# Patient Record
Sex: FEMALE | Race: WHITE | NOT HISPANIC OR LATINO | Employment: FULL TIME | URBAN - METROPOLITAN AREA
[De-identification: names, ages, dates, MRNs, and addresses within clinical notes are randomized per-mention and may not be internally consistent; named-entity substitution may affect disease eponyms.]

---

## 2023-07-07 ENCOUNTER — OFFICE VISIT (OUTPATIENT)
Dept: PRIMARY CARE | Facility: CLINIC | Age: 48
End: 2023-07-07
Payer: COMMERCIAL

## 2023-07-07 VITALS
HEIGHT: 66 IN | WEIGHT: 266 LBS | TEMPERATURE: 97.9 F | DIASTOLIC BLOOD PRESSURE: 84 MMHG | RESPIRATION RATE: 16 BRPM | HEART RATE: 68 BPM | BODY MASS INDEX: 42.75 KG/M2 | SYSTOLIC BLOOD PRESSURE: 132 MMHG | OXYGEN SATURATION: 98 %

## 2023-07-07 DIAGNOSIS — Z12.31 ENCOUNTER FOR SCREENING MAMMOGRAM FOR MALIGNANT NEOPLASM OF BREAST: ICD-10-CM

## 2023-07-07 DIAGNOSIS — E78.2 MIXED HYPERLIPIDEMIA: ICD-10-CM

## 2023-07-07 DIAGNOSIS — Z12.11 SCREENING FOR COLON CANCER: ICD-10-CM

## 2023-07-07 DIAGNOSIS — J30.1 ALLERGIC RHINITIS DUE TO POLLEN, UNSPECIFIED SEASONALITY: ICD-10-CM

## 2023-07-07 DIAGNOSIS — E03.9 ACQUIRED HYPOTHYROIDISM: ICD-10-CM

## 2023-07-07 DIAGNOSIS — I10 PRIMARY HYPERTENSION: Primary | ICD-10-CM

## 2023-07-07 PROBLEM — E66.01 OBESITY, CLASS III, BMI 40-49.9 (MORBID OBESITY) (MULTI): Status: ACTIVE | Noted: 2019-12-06

## 2023-07-07 PROBLEM — E66.813 OBESITY, CLASS III, BMI 40-49.9 (MORBID OBESITY): Status: ACTIVE | Noted: 2019-12-06

## 2023-07-07 PROBLEM — F32.A DEPRESSION: Status: ACTIVE | Noted: 2023-07-07

## 2023-07-07 PROCEDURE — 1036F TOBACCO NON-USER: CPT | Performed by: INTERNAL MEDICINE

## 2023-07-07 PROCEDURE — 3075F SYST BP GE 130 - 139MM HG: CPT | Performed by: INTERNAL MEDICINE

## 2023-07-07 PROCEDURE — 99204 OFFICE O/P NEW MOD 45 MIN: CPT | Performed by: INTERNAL MEDICINE

## 2023-07-07 PROCEDURE — 3079F DIAST BP 80-89 MM HG: CPT | Performed by: INTERNAL MEDICINE

## 2023-07-07 RX ORDER — ACETAMINOPHEN 500 MG
TABLET ORAL
COMMUNITY

## 2023-07-07 RX ORDER — LEVOTHYROXINE SODIUM 125 UG/1
TABLET ORAL
COMMUNITY
Start: 2023-06-29

## 2023-07-07 RX ORDER — HYDROCHLOROTHIAZIDE 25 MG/1
1 TABLET ORAL DAILY
COMMUNITY
Start: 2023-05-11

## 2023-07-07 RX ORDER — ESCITALOPRAM OXALATE 10 MG/1
1 TABLET ORAL DAILY
COMMUNITY
Start: 2019-07-11 | End: 2024-04-30 | Stop reason: SDUPTHER

## 2023-07-07 RX ORDER — GLUCOSAM/CHONDRO/HERB 149/HYAL 750-100 MG
1 TABLET ORAL
COMMUNITY
Start: 2021-08-26

## 2023-07-07 ASSESSMENT — ENCOUNTER SYMPTOMS
EYE REDNESS: 0
PALPITATIONS: 0
COUGH: 0
DIFFICULTY URINATING: 0
NAUSEA: 0
SORE THROAT: 0
UNEXPECTED WEIGHT CHANGE: 0
WEAKNESS: 0
ARTHRALGIAS: 0
BACK PAIN: 0
ABDOMINAL PAIN: 0
SHORTNESS OF BREATH: 0
EYE ITCHING: 0
FATIGUE: 0
EYE PAIN: 0
RHINORRHEA: 0
PSYCHIATRIC NEGATIVE: 1
WHEEZING: 0
DIARRHEA: 0
FEVER: 0
HEADACHES: 0
FREQUENCY: 0
DYSURIA: 0
CONSTIPATION: 0

## 2023-07-07 ASSESSMENT — PATIENT HEALTH QUESTIONNAIRE - PHQ9
1. LITTLE INTEREST OR PLEASURE IN DOING THINGS: NOT AT ALL
2. FEELING DOWN, DEPRESSED OR HOPELESS: NOT AT ALL
SUM OF ALL RESPONSES TO PHQ9 QUESTIONS 1 AND 2: 0

## 2023-07-07 ASSESSMENT — PAIN SCALES - GENERAL: PAINLEVEL: 0-NO PAIN

## 2023-07-07 NOTE — PROGRESS NOTES
"Subjective   Ayah Corral is a 48 y.o. female who presents for Establish Care.    HPI   NP to establish  H/o HTN, HLD, Hypothyroidism.    C/o difficulty w/losing weight.  Denies following specified diet, exercise.  Previously discussed weight loss meds w/previous PCP.  Old PCP rx'd diabetic weekly injectable for weight loss.  Not covered d/t dx.  Also discussed topamax.  Pt not comfortable w/taking med.      Review of Systems   Constitutional:  Negative for fatigue, fever and unexpected weight change.   HENT:  Negative for congestion, ear pain, rhinorrhea and sore throat.    Eyes:  Negative for pain, redness and itching.   Respiratory:  Negative for cough, shortness of breath and wheezing.    Cardiovascular:  Negative for chest pain, palpitations and leg swelling.   Gastrointestinal:  Negative for abdominal pain, constipation, diarrhea and nausea.   Genitourinary:  Negative for difficulty urinating, dysuria and frequency.   Musculoskeletal:  Negative for arthralgias and back pain.   Allergic/Immunologic: Negative for environmental allergies, food allergies and immunocompromised state.   Neurological:  Negative for weakness and headaches.   Psychiatric/Behavioral: Negative.     All other systems reviewed and are negative.      Health Maintenance Due   Topic Date Due    Yearly Adult Physical  Never done    Hepatitis B Vaccines (1 of 3 - 3-dose series) Never done    HIV Screening  Never done    Colorectal Cancer Screening  Never done    MMR Vaccines (1 of 1 - Standard series) Never done    Hepatitis C Screening  Never done    Cervical Cancer Screening  Never done    Mammogram  Never done    COVID-19 Vaccine (3 - Booster for Pfizer series) 03/08/2021    Zoster Vaccines (1 of 2) Never done    Lung Cancer Screening  Never done       Objective   /84   Pulse 68   Temp 36.6 °C (97.9 °F)   Resp 16   Ht 1.664 m (5' 5.5\")   Wt 121 kg (266 lb)   SpO2 98%   BMI 43.59 kg/m²     Physical Exam  Vitals and nursing note " reviewed.   Constitutional:       Appearance: Normal appearance.   HENT:      Head: Normocephalic.   Eyes:      Conjunctiva/sclera: Conjunctivae normal.      Pupils: Pupils are equal, round, and reactive to light.   Cardiovascular:      Rate and Rhythm: Normal rate and regular rhythm.      Pulses: Normal pulses.      Heart sounds: Normal heart sounds.   Pulmonary:      Effort: Pulmonary effort is normal.      Breath sounds: Normal breath sounds.   Musculoskeletal:         General: No swelling.      Cervical back: Neck supple.   Skin:     General: Skin is warm and dry.   Neurological:      General: No focal deficit present.      Mental Status: She is oriented to person, place, and time.         Assessment/Plan   Problem List Items Addressed This Visit       Primary hypertension - Primary    Mixed hyperlipidemia    Acquired hypothyroidism    Relevant Orders    Thyroid Stimulating Hormone    Allergic rhinitis due to pollen     Other Visit Diagnoses       Encounter for screening mammogram for malignant neoplasm of breast        Relevant Orders    BI mammo bilateral screening tomosynthesis    Screening for colon cancer        Relevant Orders    Cologuard® colon cancer screening        Reviewed health history  Increase levothyroxine 1.5 on mon and th  Recheck tsh 2 months  Discussed weight loss - meds and lifestyle  Reviewed recent labs  Cont current medications  Update preventive  Fu in 6 months  ? duloxetine

## 2023-08-16 ENCOUNTER — TELEPHONE (OUTPATIENT)
Dept: PRIMARY CARE | Facility: CLINIC | Age: 48
End: 2023-08-16

## 2023-08-16 NOTE — TELEPHONE ENCOUNTER
----- Message from Vicky Mayes DO sent at 8/16/2023 10:14 AM EDT -----  Call patient mammogram is normal.

## 2023-09-16 ENCOUNTER — LAB (OUTPATIENT)
Dept: LAB | Facility: LAB | Age: 48
End: 2023-09-16
Payer: COMMERCIAL

## 2023-09-16 DIAGNOSIS — E03.9 ACQUIRED HYPOTHYROIDISM: ICD-10-CM

## 2023-09-16 LAB — THYROTROPIN (MIU/L) IN SER/PLAS BY DETECTION LIMIT <= 0.05 MIU/L: 0.53 MIU/L (ref 0.44–3.98)

## 2023-09-16 PROCEDURE — 84443 ASSAY THYROID STIM HORMONE: CPT

## 2023-09-16 PROCEDURE — 36415 COLL VENOUS BLD VENIPUNCTURE: CPT

## 2023-09-18 ENCOUNTER — TELEPHONE (OUTPATIENT)
Dept: PRIMARY CARE | Facility: CLINIC | Age: 48
End: 2023-09-18

## 2023-10-18 PROBLEM — E55.9 VITAMIN D DEFICIENCY: Status: ACTIVE | Noted: 2021-10-27

## 2023-10-18 PROBLEM — F33.41 RECURRENT MAJOR DEPRESSIVE DISORDER, IN PARTIAL REMISSION (CMS-HCC): Status: ACTIVE | Noted: 2019-12-06

## 2023-10-18 PROBLEM — R73.03 PREDIABETES: Status: ACTIVE | Noted: 2021-07-08

## 2023-10-18 PROBLEM — E66.01 MORBID (SEVERE) OBESITY DUE TO EXCESS CALORIES (MULTI): Status: ACTIVE | Noted: 2023-10-18

## 2023-10-19 ENCOUNTER — ANESTHESIA EVENT (OUTPATIENT)
Dept: GASTROENTEROLOGY | Facility: EXTERNAL LOCATION | Age: 48
End: 2023-10-19

## 2023-10-19 ENCOUNTER — HOSPITAL ENCOUNTER (OUTPATIENT)
Dept: GASTROENTEROLOGY | Facility: EXTERNAL LOCATION | Age: 48
Discharge: HOME | End: 2023-10-19
Payer: COMMERCIAL

## 2023-10-19 ENCOUNTER — ANESTHESIA (OUTPATIENT)
Dept: GASTROENTEROLOGY | Facility: EXTERNAL LOCATION | Age: 48
End: 2023-10-19

## 2023-10-19 VITALS
HEIGHT: 66 IN | SYSTOLIC BLOOD PRESSURE: 138 MMHG | RESPIRATION RATE: 18 BRPM | WEIGHT: 260 LBS | HEART RATE: 72 BPM | TEMPERATURE: 97.2 F | BODY MASS INDEX: 41.78 KG/M2 | DIASTOLIC BLOOD PRESSURE: 88 MMHG | OXYGEN SATURATION: 98 %

## 2023-10-19 DIAGNOSIS — Z12.11 ENCOUNTER FOR SCREENING FOR MALIGNANT NEOPLASM OF COLON: Primary | ICD-10-CM

## 2023-10-19 PROCEDURE — 88305 TISSUE EXAM BY PATHOLOGIST: CPT | Performed by: PATHOLOGY

## 2023-10-19 PROCEDURE — 88305 TISSUE EXAM BY PATHOLOGIST: CPT | Mod: TC,ELYLAB | Performed by: STUDENT IN AN ORGANIZED HEALTH CARE EDUCATION/TRAINING PROGRAM

## 2023-10-19 PROCEDURE — 88305 TISSUE EXAM BY PATHOLOGIST: CPT | Mod: TC,SUR,ELYLAB | Performed by: STUDENT IN AN ORGANIZED HEALTH CARE EDUCATION/TRAINING PROGRAM

## 2023-10-19 PROCEDURE — 45385 COLONOSCOPY W/LESION REMOVAL: CPT | Performed by: STUDENT IN AN ORGANIZED HEALTH CARE EDUCATION/TRAINING PROGRAM

## 2023-10-19 RX ORDER — LIDOCAINE HYDROCHLORIDE 20 MG/ML
INJECTION, SOLUTION INFILTRATION; PERINEURAL AS NEEDED
Status: DISCONTINUED | OUTPATIENT
Start: 2023-10-19 | End: 2023-10-19

## 2023-10-19 RX ORDER — SODIUM CHLORIDE 9 MG/ML
20 INJECTION, SOLUTION INTRAVENOUS CONTINUOUS
Status: DISCONTINUED | OUTPATIENT
Start: 2023-10-19 | End: 2023-10-20 | Stop reason: HOSPADM

## 2023-10-19 RX ORDER — PROPOFOL 10 MG/ML
INJECTION, EMULSION INTRAVENOUS AS NEEDED
Status: DISCONTINUED | OUTPATIENT
Start: 2023-10-19 | End: 2023-10-19

## 2023-10-19 RX ADMIN — PROPOFOL 50 MG: 10 INJECTION, EMULSION INTRAVENOUS at 10:12

## 2023-10-19 RX ADMIN — LIDOCAINE HYDROCHLORIDE 50 MG: 20 INJECTION, SOLUTION INFILTRATION; PERINEURAL at 09:37

## 2023-10-19 RX ADMIN — PROPOFOL 100 MG: 10 INJECTION, EMULSION INTRAVENOUS at 09:37

## 2023-10-19 RX ADMIN — PROPOFOL 100 MG: 10 INJECTION, EMULSION INTRAVENOUS at 09:51

## 2023-10-19 RX ADMIN — PROPOFOL 100 MG: 10 INJECTION, EMULSION INTRAVENOUS at 09:42

## 2023-10-19 RX ADMIN — PROPOFOL 100 MG: 10 INJECTION, EMULSION INTRAVENOUS at 10:01

## 2023-10-19 RX ADMIN — PROPOFOL 50 MG: 10 INJECTION, EMULSION INTRAVENOUS at 09:49

## 2023-10-19 RX ADMIN — PROPOFOL 50 MG: 10 INJECTION, EMULSION INTRAVENOUS at 10:08

## 2023-10-19 RX ADMIN — SODIUM CHLORIDE: 9 INJECTION, SOLUTION INTRAVENOUS at 09:34

## 2023-10-19 SDOH — HEALTH STABILITY: MENTAL HEALTH: CURRENT SMOKER: 0

## 2023-10-19 ASSESSMENT — PAIN SCALES - GENERAL
PAIN_LEVEL: 0
PAINLEVEL_OUTOF10: 0 - NO PAIN

## 2023-10-19 ASSESSMENT — PAIN - FUNCTIONAL ASSESSMENT
PAIN_FUNCTIONAL_ASSESSMENT: 0-10

## 2023-10-19 NOTE — H&P
Outpatient Hospital Procedure H&P    Patient Profile-Procedures  Name Ayah Corral  Date of Birth 1975  MRN [id number]  Address   [address] [facility] [id number] [facility] [id number]    Primary Phone Number [phone]  Secondary Phone Number    PCP Vicky Mayes    Procedure(s):  Colonoscopy.  Primary contact name and number   Extended Emergency Contact Information  Primary Emergency Contact: Amina Perkins  Address: [address] [place] [address] [facility]  Home Phone: [phone]  Mobile Phone: [phone]  Relation: Sister  Secondary Emergency Contact: Jennifer Corral  Address: [address] [place] [address] [place]  Mobile Phone: [phone]  Relation: Child    General Health  Weight   Vitals:    10/19/23 0838   Weight: 118 kg (260 lb)     BMI Body mass index is 41.97 kg/m².    Allergies  Allergies   Allergen Reactions    Sulfamethoxazole-Trimethoprim Rash and Swelling       Past Medical History   Past Medical History:   Diagnosis Date    Depression 12/2002    Eczema 5/2020    Hypertension 5/2020    Hypothyroid        Provider assessment  Diagnosis: Colon cancer screening  Medication Reviewed - yes  Prior to Admission medications    Medication Sig Start Date End Date Taking? Authorizing Provider   cholecalciferol (Vitamin D3) 5,000 Units tablet Take 1 tab by mouth daily.   Yes Historical Provider, MD   escitalopram (Lexapro) 10 mg tablet Take 1 tablet (10 mg) by mouth once daily. 7/11/19  Yes Historical Provider, MD   fexofenadine HCl (ALLEGRA ORAL) Take 1 tablet by mouth once daily.   Yes Historical Provider, MD   hydroCHLOROthiazide (HYDRODiuril) 25 mg tablet Take 1 tablet (25 mg) by mouth once daily. 5/11/23  Yes Historical Provider, MD   levothyroxine (Synthroid, Levoxyl) 125 mcg tablet Take 1 tablet (125 mcg) by mouth once daily in the morning. Take before  meals. 6/29/23  Yes Historical Provider, MD   omega 3-dha-epa-fish oil 1,000 mg (120 mg-180 mg) capsule Take 1 capsule (1,000 mg) by mouth once daily. 8/26/21  Yes Historical Provider, MD   VITAMIN B COMPLEX ORAL Take 1 tablet by mouth once daily.   Yes Historical Provider, MD       Physical Exam  Vitals:    10/19/23 0838   BP: 150/84   Pulse: 82   Resp: 16   Temp: 36.2 °C (97.2 °F)   SpO2: 96%        General: A&Ox3, NAD.  HEENT: AT/NC.   CV: RRR. No murmur.  Resp: CTA bilaterally. No wheezing, rhonchi or rales.   GI: Soft, NT/ND. BSx4.  Extrem: No edema. Pulses intact.  Skin: No Jaundice.   Neuro: No focal deficits.   Psych: Normal mood and affect.        Oropharyngeal Classification III (soft and hard palate and base of uvula visible)  ASA PS Classification 3  Sedation Plan: Deep Sedation.  Procedure Plan - pre-procedural (re)assesment completed by physician:  discharge/transfer patient when discharge criteria met    Shoaib Polanco DO  10/19/2023 9:29 AM

## 2023-10-19 NOTE — DISCHARGE INSTRUCTIONS
Patient Instructions Post Procedure      The anesthetics, sedatives or narcotics which were given to you today will be acting in your body for the next 24 hours, so you might feel a little sleepy or groggy.  This feeling should slowly wear off. Carefully read and follow the instructions.     You received sedation today:  - Do not drive or operate any machinery or power tools of any kind.   - No alcoholic beverages today, not even beer or wine.  - Do not make any important decisions or sign any legal documents.  - No over the counter medications that contain alcohol or that may cause drowsiness.    While it is common to experience mild to moderate abdominal distention, gas, or belching after your procedure, if any of these symptoms occur following discharge from the GI Lab or within one week of having your procedure, call the [facility] to be advised whether a visit to your nearest Urgent Care or Emergency Department is indicated.  Take this paper with you if you go.   - If you develop an allergic reaction to the medications that were given during your procedure such as difficulty breathing, rash, hives, severe nausea, vomiting or lightheadedness.  - If you experience chest pain, shortness of breath, severe abdominal pain, fevers and chills.  -If you develop signs and symptoms of bleeding such as blood in your spit, if your stools turn black, tarry, or bloody  - If you have not urinated within 8 hours following your procedure.  - If your IV site becomes painful, red, inflamed, or looks infected.    If you received a biopsy/polypectomy/sphincterotomy the following instructions apply below:  __ Do not use Aspirin containing products, non-steroidal medications or anti-coagulants for one week following your procedure. (Examples of these types of medications are: Advil, Arthrotec, Aleve, Coumadin, Ecotrin, Heparin, Ibuprofen, Indocin, Motrin, Naprosyn, Nuprin, Plavix, Vioxx, and Voltarin, or their generic  forms.  This list is not all-inclusive.  Check with your physician or pharmacist before resuming medications.)   __ Eat a soft diet today.  Avoid foods that are poorly digested for the next 24 hours.  These foods would include: nuts, beans, lettuce, red meats, and fried foods. Start with liquids and advance your diet as tolerated, gradually work up to eating solids.   __ Do not have a Barium Study or Enema for one week.    Your physician recommends the additional following instructions:    -You have a contact number available for emergencies. The signs and symptoms of potential delayed complications were discussed with you. You may return to normal activities tomorrow.  -Resume your previous diet or other if specified.  -Continue your present medications.   -We are waiting for your pathology results, if applicable.  -The findings and recommendations have been discussed with you and/or family.  - Please see Medication Reconciliation Form for new medication/medications prescribed.     If you experience any problems or have any questions following discharge from the GI Lab, please call: [phone] from 7 am- 4:30 pm.  In the event of an emergency please go to the closest Emergency Department or call Dr. Polanco at [phone]

## 2023-10-19 NOTE — ANESTHESIA POSTPROCEDURE EVALUATION
Patient: Ayah Corral    Procedure Summary       Date: 10/19/23 Room / Location: [facility]    Anesthesia Start: 0934 Anesthesia Stop:     Procedure: COLONOSCOPY Diagnosis:       Encounter for screening for malignant neoplasm of colon      Encounter for screening for malignant neoplasm of colon    Scheduled Providers: Shoaib Polanco DO; Betty Ortega RN Responsible Provider: JAQUAN Garza    Anesthesia Type: MAC ASA Status: 3            Anesthesia Type: MAC    Vitals Value Taken Time   /78 10/19/23 1021   Temp 36.8 10/19/23 1021   Pulse 80 10/19/23 1021   Resp 16 10/19/23 1021   SpO2 98 10/19/23 1021       Anesthesia Post Evaluation    Patient location during evaluation: bedside  Patient participation: complete - patient cannot participate  Level of consciousness: awake and responsive to verbal stimuli  Pain score: 0  Pain management: adequate  Airway patency: patent  Cardiovascular status: acceptable and hemodynamically stable  Respiratory status: acceptable  Hydration status: acceptable        No notable events documented.

## 2023-10-19 NOTE — ANESTHESIA PREPROCEDURE EVALUATION
Patient: Ayah Corral    Procedure Information       Date/Time: 10/19/23 0830    Scheduled providers: Shoaib Polanco DO; Betty Ortega RN    Procedure: COLONOSCOPY    Location: [facility]            Relevant Problems   Cardiovascular   (+) Mixed hyperlipidemia   (+) Primary hypertension      Endocrine   (+) Acquired hypothyroidism   (+) Morbid (severe) obesity due to excess calories (CMS/HCC)      Neuro/Psych   (+) Depression   (+) Recurrent major depressive disorder, in partial remission (CMS/HCC)       Clinical information reviewed:   Tobacco  Allergies  Meds   Med Hx  Surg Hx   Fam Hx  Soc Hx        NPO Detail:  NPO/Void Status  Date of Last Liquid: 10/18/23  Time of Last Liquid: 2300  Date of Last Solid: 10/17/23  Time of Last Solid: 2000         Physical Exam    Airway  Mallampati: III  TM distance: >3 FB  Neck ROM: full     Cardiovascular - normal exam     Dental - normal exam     Pulmonary - normal exam  Breath sounds clear to auscultation     Abdominal            Anesthesia Plan    ASA 3     MAC     The patient is not a current smoker.    intravenous induction   Anesthetic plan and risks discussed with patient.  Use of blood products discussed with patient who.    Plan discussed with CRNA.

## 2023-10-31 LAB
LABORATORY COMMENT REPORT: NORMAL
PATH REPORT.FINAL DX SPEC: NORMAL
PATH REPORT.GROSS SPEC: NORMAL
PATH REPORT.TOTAL CANCER: NORMAL

## 2024-01-09 ENCOUNTER — OFFICE VISIT (OUTPATIENT)
Dept: PRIMARY CARE | Facility: CLINIC | Age: 49
End: 2024-01-09
Payer: COMMERCIAL

## 2024-01-09 VITALS
WEIGHT: 274 LBS | RESPIRATION RATE: 16 BRPM | HEART RATE: 80 BPM | SYSTOLIC BLOOD PRESSURE: 126 MMHG | HEIGHT: 66 IN | TEMPERATURE: 97.8 F | OXYGEN SATURATION: 97 % | DIASTOLIC BLOOD PRESSURE: 78 MMHG | BODY MASS INDEX: 44.03 KG/M2

## 2024-01-09 DIAGNOSIS — E03.9 ACQUIRED HYPOTHYROIDISM: ICD-10-CM

## 2024-01-09 DIAGNOSIS — L60.8 CHANGE IN NAIL APPEARANCE: ICD-10-CM

## 2024-01-09 DIAGNOSIS — E66.01 OBESITY, CLASS III, BMI 40-49.9 (MORBID OBESITY) (MULTI): Primary | ICD-10-CM

## 2024-01-09 PROCEDURE — 3078F DIAST BP <80 MM HG: CPT | Performed by: INTERNAL MEDICINE

## 2024-01-09 PROCEDURE — 3074F SYST BP LT 130 MM HG: CPT | Performed by: INTERNAL MEDICINE

## 2024-01-09 PROCEDURE — 99214 OFFICE O/P EST MOD 30 MIN: CPT | Performed by: INTERNAL MEDICINE

## 2024-01-09 PROCEDURE — 1036F TOBACCO NON-USER: CPT | Performed by: INTERNAL MEDICINE

## 2024-01-09 RX ORDER — METFORMIN HYDROCHLORIDE 500 MG/1
500 TABLET, EXTENDED RELEASE ORAL DAILY
COMMUNITY
End: 2024-05-01 | Stop reason: WASHOUT

## 2024-01-09 NOTE — PROGRESS NOTES
"Subjective   Ayah Corral is a 48 y.o. female who presents for 6 month Follow-up.    HPI   Monitors BP occasionally.  Mild SBP elevation.  Ave 130-140/80.  Denies adverse sx's r/t HTN.    Feeling better w/increase in Levothyroxine dose.  Nails brittle, \"shredding\".    Started Metformin via online provider for weight loss at end of 12/2023.  Tolerating well.  Denies adverse s/e's.  GLP-1's not covered under current plan.      Review of Systems   Constitutional:  Negative for fatigue and fever.   Respiratory:  Negative for cough and shortness of breath.    Cardiovascular:  Negative for chest pain and leg swelling.   All other systems reviewed and are negative.      Health Maintenance Due   Topic Date Due    Yearly Adult Physical  Never done    Hepatitis B Vaccines (1 of 3 - 3-dose series) Never done    HIV Screening  Never done    MMR Vaccines (1 of 1 - Standard series) Never done    Hepatitis C Screening  Never done    Cervical Cancer Screening  Never done    Lung Cancer Screening  Never done    COVID-19 Vaccine (4 - Pfizer series) 12/08/2023    Lipid Panel  01/23/2024       Objective   /78   Pulse 80   Temp 36.6 °C (97.8 °F)   Resp 16   Ht 1.676 m (5' 6\")   Wt 124 kg (274 lb)   SpO2 97%   BMI 44.22 kg/m²     Physical Exam  Vitals and nursing note reviewed.   Constitutional:       Appearance: Normal appearance.   HENT:      Head: Normocephalic.   Eyes:      Conjunctiva/sclera: Conjunctivae normal.      Pupils: Pupils are equal, round, and reactive to light.   Cardiovascular:      Rate and Rhythm: Normal rate and regular rhythm.      Pulses: Normal pulses.      Heart sounds: Normal heart sounds.   Pulmonary:      Effort: Pulmonary effort is normal.      Breath sounds: Normal breath sounds.   Musculoskeletal:         General: No swelling.      Cervical back: Neck supple.   Skin:     General: Skin is warm and dry.   Neurological:      General: No focal deficit present.      Mental Status: She is oriented " to person, place, and time.         Assessment/Plan   Problem List Items Addressed This Visit       Acquired hypothyroidism    Relevant Orders    Thyroid Stimulating Hormone    Obesity, Class III, BMI 40-49.9 (morbid obesity) (CMS/[facility]) - Primary     Other Visit Diagnoses       Change in nail appearance        Relevant Orders    Referral to Dermatology          Trial of semaglutide  Cont meds  Check tsh  Refer to derm for nail changes  Discussed risk and benefit and side effects

## 2024-01-10 ASSESSMENT — ENCOUNTER SYMPTOMS
COUGH: 0
SHORTNESS OF BREATH: 0
FEVER: 0
FATIGUE: 0

## 2024-04-30 DIAGNOSIS — F33.41 RECURRENT MAJOR DEPRESSIVE DISORDER, IN PARTIAL REMISSION (CMS-HCC): ICD-10-CM

## 2024-04-30 RX ORDER — ESCITALOPRAM OXALATE 10 MG/1
10 TABLET ORAL DAILY
Qty: 90 TABLET | Refills: 3 | Status: SHIPPED | OUTPATIENT
Start: 2024-04-30 | End: 2025-04-30

## 2024-05-01 ENCOUNTER — OFFICE VISIT (OUTPATIENT)
Dept: PRIMARY CARE | Facility: CLINIC | Age: 49
End: 2024-05-01
Payer: COMMERCIAL

## 2024-05-01 VITALS
DIASTOLIC BLOOD PRESSURE: 76 MMHG | WEIGHT: 248.4 LBS | TEMPERATURE: 98 F | SYSTOLIC BLOOD PRESSURE: 124 MMHG | BODY MASS INDEX: 40.09 KG/M2 | OXYGEN SATURATION: 97 % | RESPIRATION RATE: 20 BRPM | HEART RATE: 74 BPM

## 2024-05-01 DIAGNOSIS — R39.9 UTI SYMPTOMS: Primary | ICD-10-CM

## 2024-05-01 LAB
POC APPEARANCE, URINE: CLEAR
POC BILIRUBIN, URINE: NEGATIVE
POC BLOOD, URINE: NEGATIVE
POC COLOR, URINE: YELLOW
POC GLUCOSE, URINE: NEGATIVE MG/DL
POC KETONES, URINE: NEGATIVE MG/DL
POC LEUKOCYTES, URINE: ABNORMAL
POC NITRITE,URINE: NEGATIVE
POC PH, URINE: 6.5 PH
POC PROTEIN, URINE: NEGATIVE MG/DL
POC SPECIFIC GRAVITY, URINE: 1.01
POC UROBILINOGEN, URINE: 0.2 EU/DL

## 2024-05-01 PROCEDURE — 99213 OFFICE O/P EST LOW 20 MIN: CPT | Performed by: NURSE PRACTITIONER

## 2024-05-01 PROCEDURE — 3078F DIAST BP <80 MM HG: CPT | Performed by: NURSE PRACTITIONER

## 2024-05-01 PROCEDURE — 81002 URINALYSIS NONAUTO W/O SCOPE: CPT | Performed by: NURSE PRACTITIONER

## 2024-05-01 PROCEDURE — 87186 SC STD MICRODIL/AGAR DIL: CPT

## 2024-05-01 PROCEDURE — 3074F SYST BP LT 130 MM HG: CPT | Performed by: NURSE PRACTITIONER

## 2024-05-01 PROCEDURE — 87086 URINE CULTURE/COLONY COUNT: CPT

## 2024-05-01 PROCEDURE — 1036F TOBACCO NON-USER: CPT | Performed by: NURSE PRACTITIONER

## 2024-05-01 RX ORDER — NITROFURANTOIN 25; 75 MG/1; MG/1
100 CAPSULE ORAL 2 TIMES DAILY
Qty: 10 CAPSULE | Refills: 0 | Status: SHIPPED | OUTPATIENT
Start: 2024-05-01 | End: 2024-05-06

## 2024-05-01 ASSESSMENT — ENCOUNTER SYMPTOMS
VOMITING: 0
CHILLS: 0
DIARRHEA: 0
HEMATURIA: 0
FEVER: 0
NAUSEA: 0
APPETITE CHANGE: 0
CHOKING: 0
CONSTIPATION: 0
BACK PAIN: 0
FLANK PAIN: 0
FREQUENCY: 1
SLEEP DISTURBANCE: 0
DYSURIA: 1
ACTIVITY CHANGE: 0

## 2024-05-01 NOTE — PROGRESS NOTES
Subjective   Patient ID: Ayah Corral is a 48 y.o. female who presents for UTI (pressure).    UTI Symptoms x1 week  Frequency  Urgency  Burning  Pelvic pressure  No fever or chills  No flank pain  No GI issues    OTC- cranberry juice    UTI   This is a new problem. The current episode started in the past 7 days. The problem occurs every urination. The problem has been gradually worsening. The quality of the pain is described as burning. The pain is mild. There has been no fever. Associated symptoms include frequency and urgency. Pertinent negatives include no chills, flank pain, hematuria, nausea or vomiting. She has tried increased fluids for the symptoms. The treatment provided no relief.        Review of Systems   Constitutional:  Negative for activity change, appetite change, chills and fever.   HENT:  Negative for congestion.    Respiratory:  Negative for choking.    Gastrointestinal:  Negative for constipation, diarrhea, nausea and vomiting.   Genitourinary:  Positive for dysuria, frequency, pelvic pain and urgency. Negative for flank pain and hematuria.   Musculoskeletal:  Negative for back pain.   Psychiatric/Behavioral:  Negative for sleep disturbance.        Objective   /76   Pulse 74   Temp 36.7 °C (98 °F)   Resp 20   Wt 113 kg (248 lb 6.4 oz)   SpO2 97%   BMI 40.09 kg/m²     Physical Exam  Vitals reviewed.   Constitutional:       Appearance: Normal appearance.   HENT:      Head: Normocephalic.   Eyes:      Pupils: Pupils are equal, round, and reactive to light.   Cardiovascular:      Rate and Rhythm: Normal rate and regular rhythm.      Pulses: Normal pulses.      Heart sounds: Normal heart sounds.   Pulmonary:      Effort: Pulmonary effort is normal.      Breath sounds: Normal breath sounds.   Abdominal:      General: Abdomen is flat. There is no distension.      Palpations: Abdomen is soft.      Tenderness: There is no abdominal tenderness. There is no right CVA tenderness, left CVA  tenderness, guarding or rebound.   Musculoskeletal:      Cervical back: Normal range of motion.   Skin:     General: Skin is warm.      Capillary Refill: Capillary refill takes less than 2 seconds.   Neurological:      General: No focal deficit present.      Mental Status: She is alert and oriented to person, place, and time.   Psychiatric:         Mood and Affect: Mood normal.         Behavior: Behavior normal.         Assessment/Plan   Problem List Items Addressed This Visit             ICD-10-CM    UTI symptoms - Primary R39.9     IO UA indicates possible UTI  Urine culture to be sent; Will follow up on results as needed  Antibiotic sent in; Take full course until complete  Increase hydration  F/U with PCP if not improving over the next 3-4 days  Utilize ER for SOB, worsening back pain, uncontrolled fevers         Relevant Medications    nitrofurantoin, macrocrystal-monohydrate, (Macrobid) 100 mg capsule    Other Relevant Orders    POCT UA (nonautomated) manually resulted (Completed)    Urine Culture

## 2024-05-01 NOTE — ASSESSMENT & PLAN NOTE
IO UA indicates possible UTI  Urine culture to be sent; Will follow up on results as needed  Antibiotic sent in; Take full course until complete  Increase hydration  F/U with PCP if not improving over the next 3-4 days  Utilize ER for SOB, worsening back pain, uncontrolled fevers

## 2024-05-04 LAB — BACTERIA UR CULT: ABNORMAL

## 2024-05-06 RX ORDER — CIPROFLOXACIN 250 MG/1
250 TABLET, FILM COATED ORAL 2 TIMES DAILY
Qty: 6 TABLET | Refills: 0 | Status: SHIPPED | OUTPATIENT
Start: 2024-05-06 | End: 2024-05-09

## 2024-06-21 ENCOUNTER — LAB (OUTPATIENT)
Dept: LAB | Facility: LAB | Age: 49
End: 2024-06-21
Payer: COMMERCIAL

## 2024-06-21 DIAGNOSIS — E03.9 ACQUIRED HYPOTHYROIDISM: ICD-10-CM

## 2024-06-21 LAB — TSH SERPL-ACNC: 0.14 MIU/L (ref 0.44–3.98)

## 2024-06-21 PROCEDURE — 84443 ASSAY THYROID STIM HORMONE: CPT

## 2024-06-21 PROCEDURE — 36415 COLL VENOUS BLD VENIPUNCTURE: CPT

## 2024-06-25 ASSESSMENT — PROMIS GLOBAL HEALTH SCALE
CARRYOUT_SOCIAL_ACTIVITIES: EXCELLENT
RATE_MENTAL_HEALTH: VERY GOOD
EMOTIONAL_PROBLEMS: RARELY
RATE_AVERAGE_FATIGUE: MILD
RATE_GENERAL_HEALTH: GOOD
RATE_QUALITY_OF_LIFE: GOOD
RATE_AVERAGE_PAIN: 0
CARRYOUT_PHYSICAL_ACTIVITIES: COMPLETELY
RATE_SOCIAL_SATISFACTION: VERY GOOD
RATE_PHYSICAL_HEALTH: GOOD

## 2024-06-26 ENCOUNTER — APPOINTMENT (OUTPATIENT)
Dept: PRIMARY CARE | Facility: CLINIC | Age: 49
End: 2024-06-26
Payer: COMMERCIAL

## 2024-06-26 VITALS
HEART RATE: 68 BPM | TEMPERATURE: 97.5 F | HEIGHT: 66 IN | OXYGEN SATURATION: 100 % | SYSTOLIC BLOOD PRESSURE: 126 MMHG | WEIGHT: 237 LBS | DIASTOLIC BLOOD PRESSURE: 78 MMHG | BODY MASS INDEX: 38.09 KG/M2 | RESPIRATION RATE: 16 BRPM

## 2024-06-26 DIAGNOSIS — Z00.00 HEALTH CARE MAINTENANCE: Primary | ICD-10-CM

## 2024-06-26 DIAGNOSIS — Z12.31 ENCOUNTER FOR SCREENING MAMMOGRAM FOR MALIGNANT NEOPLASM OF BREAST: ICD-10-CM

## 2024-06-26 DIAGNOSIS — E03.9 ACQUIRED HYPOTHYROIDISM: ICD-10-CM

## 2024-06-26 PROCEDURE — 1036F TOBACCO NON-USER: CPT | Performed by: INTERNAL MEDICINE

## 2024-06-26 PROCEDURE — 3074F SYST BP LT 130 MM HG: CPT | Performed by: INTERNAL MEDICINE

## 2024-06-26 PROCEDURE — 3078F DIAST BP <80 MM HG: CPT | Performed by: INTERNAL MEDICINE

## 2024-06-26 PROCEDURE — 99396 PREV VISIT EST AGE 40-64: CPT | Performed by: INTERNAL MEDICINE

## 2024-06-26 PROCEDURE — 87624 HPV HI-RISK TYP POOLED RSLT: CPT

## 2024-06-26 RX ORDER — LEVOTHYROXINE SODIUM 125 UG/1
TABLET ORAL
Qty: 96 TABLET | Refills: 3 | Status: SHIPPED | OUTPATIENT
Start: 2024-06-26

## 2024-06-26 ASSESSMENT — ENCOUNTER SYMPTOMS
EYE REDNESS: 0
PSYCHIATRIC NEGATIVE: 1
WHEEZING: 0
EYE PAIN: 0
DYSURIA: 0
CONSTIPATION: 0
SHORTNESS OF BREATH: 0
RHINORRHEA: 0
SORE THROAT: 0
COUGH: 0
UNEXPECTED WEIGHT CHANGE: 0
HEADACHES: 0
FATIGUE: 0
DIARRHEA: 0
PALPITATIONS: 0
NAUSEA: 0
WEAKNESS: 0
DIFFICULTY URINATING: 0
FEVER: 0
ARTHRALGIAS: 0
FREQUENCY: 0
ABDOMINAL PAIN: 0
EYE ITCHING: 0
BACK PAIN: 0

## 2024-06-26 ASSESSMENT — PATIENT HEALTH QUESTIONNAIRE - PHQ9
SUM OF ALL RESPONSES TO PHQ9 QUESTIONS 1 AND 2: 0
1. LITTLE INTEREST OR PLEASURE IN DOING THINGS: NOT AT ALL
2. FEELING DOWN, DEPRESSED OR HOPELESS: NOT AT ALL

## 2024-06-26 NOTE — PROGRESS NOTES
"Subjective   Ayah Corral is a 49 y.o. female who presents for Annual Exam.    HPI   Influenza 2023  Covid 2020, 2021, 2022, 2023  Shingrix 2023 x2  Tdap 2022  Mammogram 8/2023  Pap 2019 due  Colonoscopy 2023  Bmi 38  Depression screen 24  Eye exam due    Review of Systems   Constitutional:  Negative for fatigue, fever and unexpected weight change.   HENT:  Negative for congestion, ear pain, rhinorrhea and sore throat.    Eyes:  Negative for pain, redness and itching.   Respiratory:  Negative for cough, shortness of breath and wheezing.    Cardiovascular:  Negative for chest pain, palpitations and leg swelling.   Gastrointestinal:  Negative for abdominal pain, constipation, diarrhea and nausea.   Genitourinary:  Negative for difficulty urinating, dysuria and frequency.   Musculoskeletal:  Negative for arthralgias and back pain.   Allergic/Immunologic: Negative for environmental allergies, food allergies and immunocompromised state.   Neurological:  Negative for weakness and headaches.   Psychiatric/Behavioral: Negative.     All other systems reviewed and are negative.      Health Maintenance Due   Topic Date Due    Yearly Adult Physical  Never done    HIV Screening  Never done    MMR Vaccines (1 of 1 - Standard series) Never done    Hepatitis C Screening  Never done    Hepatitis B Vaccines (1 of 3 - 19+ 3-dose series) Never done    Cervical Cancer Screening  Never done    Lung Cancer Screening  Never done    Lipid Panel  01/23/2024    Diabetes: Hemoglobin A1C  06/03/2024    Diabetes Screening  06/03/2024    Mammogram  08/11/2024       Objective   /78   Pulse 68   Temp 36.4 °C (97.5 °F)   Resp 16   Ht 1.676 m (5' 6\")   Wt 108 kg (237 lb)   SpO2 100%   BMI 38.25 kg/m²     Physical Exam  Vitals and nursing note reviewed.   Constitutional:       Appearance: Normal appearance.   HENT:      Head: Normocephalic.   Eyes:      Conjunctiva/sclera: Conjunctivae normal.      Pupils: Pupils are equal, round, and " reactive to light.   Cardiovascular:      Rate and Rhythm: Normal rate and regular rhythm.      Pulses: Normal pulses.      Heart sounds: Normal heart sounds.   Pulmonary:      Effort: Pulmonary effort is normal.      Breath sounds: Normal breath sounds.   Musculoskeletal:         General: No swelling.      Cervical back: Neck supple.   Skin:     General: Skin is warm and dry.   Neurological:      General: No focal deficit present.      Mental Status: She is oriented to person, place, and time.         Assessment/Plan   Problem List Items Addressed This Visit       Acquired hypothyroidism    Relevant Medications    levothyroxine (Synthroid, Levoxyl) 125 mcg tablet     Other Visit Diagnoses       Health care maintenance    -  Primary    Relevant Orders    CBC    Comprehensive Metabolic Panel    Lipid Panel    Thyroid Stimulating Hormone    Vitamin B12    Vitamin D 25-Hydroxy,Total (for eval of Vitamin D levels)    THINPREP PAP TEST    Encounter for screening mammogram for malignant neoplasm of breast        Relevant Orders    BI mammo bilateral screening tomosynthesis          Levothyroxine 1/2 one day a week

## 2024-07-10 LAB
CYTOLOGY CMNT CVX/VAG CYTO-IMP: NORMAL
HPV HR 12 DNA GENITAL QL NAA+PROBE: NEGATIVE
HPV HR GENOTYPES PNL CVX NAA+PROBE: NEGATIVE
HPV16 DNA SPEC QL NAA+PROBE: NEGATIVE
HPV18 DNA SPEC QL NAA+PROBE: NEGATIVE
LAB AP HPV GENOTYPE QUESTION: YES
LAB AP HPV HR: NORMAL
LABORATORY COMMENT REPORT: NORMAL
PATH REPORT.TOTAL CANCER: NORMAL

## 2024-08-16 ENCOUNTER — HOSPITAL ENCOUNTER (OUTPATIENT)
Dept: RADIOLOGY | Facility: CLINIC | Age: 49
Discharge: HOME | End: 2024-08-16
Payer: COMMERCIAL

## 2024-08-16 VITALS — HEIGHT: 66 IN | WEIGHT: 227 LBS | BODY MASS INDEX: 36.48 KG/M2

## 2024-08-16 DIAGNOSIS — Z12.31 ENCOUNTER FOR SCREENING MAMMOGRAM FOR MALIGNANT NEOPLASM OF BREAST: ICD-10-CM

## 2024-08-16 PROCEDURE — 77067 SCR MAMMO BI INCL CAD: CPT

## 2024-11-30 ENCOUNTER — LAB (OUTPATIENT)
Dept: LAB | Facility: LAB | Age: 49
End: 2024-11-30
Payer: COMMERCIAL

## 2024-11-30 DIAGNOSIS — Z00.00 HEALTH CARE MAINTENANCE: ICD-10-CM

## 2024-11-30 LAB
25(OH)D3 SERPL-MCNC: 55 NG/ML (ref 30–100)
ALBUMIN SERPL BCP-MCNC: 3.9 G/DL (ref 3.4–5)
ALP SERPL-CCNC: 71 U/L (ref 33–110)
ALT SERPL W P-5'-P-CCNC: 21 U/L (ref 7–45)
ANION GAP SERPL CALC-SCNC: 11 MMOL/L (ref 10–20)
AST SERPL W P-5'-P-CCNC: 14 U/L (ref 9–39)
BILIRUB SERPL-MCNC: 0.3 MG/DL (ref 0–1.2)
BUN SERPL-MCNC: 18 MG/DL (ref 6–23)
CALCIUM SERPL-MCNC: 9.5 MG/DL (ref 8.6–10.3)
CHLORIDE SERPL-SCNC: 105 MMOL/L (ref 98–107)
CHOLEST SERPL-MCNC: 225 MG/DL (ref 0–199)
CHOLESTEROL/HDL RATIO: 5.1
CO2 SERPL-SCNC: 31 MMOL/L (ref 21–32)
CREAT SERPL-MCNC: 0.65 MG/DL (ref 0.5–1.05)
EGFRCR SERPLBLD CKD-EPI 2021: >90 ML/MIN/1.73M*2
ERYTHROCYTE [DISTWIDTH] IN BLOOD BY AUTOMATED COUNT: 14.6 % (ref 11.5–14.5)
GLUCOSE SERPL-MCNC: 97 MG/DL (ref 74–99)
HCT VFR BLD AUTO: 42.8 % (ref 36–46)
HDLC SERPL-MCNC: 43.9 MG/DL
HGB BLD-MCNC: 13.4 G/DL (ref 12–16)
LDLC SERPL CALC-MCNC: 118 MG/DL
MCH RBC QN AUTO: 29.2 PG (ref 26–34)
MCHC RBC AUTO-ENTMCNC: 31.3 G/DL (ref 32–36)
MCV RBC AUTO: 93 FL (ref 80–100)
NON HDL CHOLESTEROL: 181 MG/DL (ref 0–149)
NRBC BLD-RTO: 0 /100 WBCS (ref 0–0)
PLATELET # BLD AUTO: 334 X10*3/UL (ref 150–450)
POTASSIUM SERPL-SCNC: 4.3 MMOL/L (ref 3.5–5.3)
PROT SERPL-MCNC: 6.3 G/DL (ref 6.4–8.2)
RBC # BLD AUTO: 4.59 X10*6/UL (ref 4–5.2)
SODIUM SERPL-SCNC: 143 MMOL/L (ref 136–145)
TRIGL SERPL-MCNC: 315 MG/DL (ref 0–149)
TSH SERPL-ACNC: 0.18 MIU/L (ref 0.44–3.98)
VIT B12 SERPL-MCNC: 531 PG/ML (ref 211–911)
VLDL: 63 MG/DL (ref 0–40)
WBC # BLD AUTO: 8.4 X10*3/UL (ref 4.4–11.3)

## 2024-11-30 PROCEDURE — 82306 VITAMIN D 25 HYDROXY: CPT

## 2024-11-30 PROCEDURE — 82607 VITAMIN B-12: CPT

## 2024-11-30 PROCEDURE — 84443 ASSAY THYROID STIM HORMONE: CPT

## 2024-11-30 PROCEDURE — 80053 COMPREHEN METABOLIC PANEL: CPT

## 2024-11-30 PROCEDURE — 85027 COMPLETE CBC AUTOMATED: CPT

## 2024-11-30 PROCEDURE — 36415 COLL VENOUS BLD VENIPUNCTURE: CPT

## 2024-11-30 PROCEDURE — 80061 LIPID PANEL: CPT

## 2024-12-02 ENCOUNTER — APPOINTMENT (OUTPATIENT)
Dept: PRIMARY CARE | Facility: CLINIC | Age: 49
End: 2024-12-02
Payer: COMMERCIAL

## 2024-12-02 VITALS
OXYGEN SATURATION: 100 % | SYSTOLIC BLOOD PRESSURE: 124 MMHG | HEIGHT: 66 IN | BODY MASS INDEX: 37.12 KG/M2 | TEMPERATURE: 98.1 F | DIASTOLIC BLOOD PRESSURE: 76 MMHG | WEIGHT: 231 LBS | RESPIRATION RATE: 16 BRPM | HEART RATE: 80 BPM

## 2024-12-02 DIAGNOSIS — E78.2 MIXED HYPERLIPIDEMIA: ICD-10-CM

## 2024-12-02 DIAGNOSIS — E03.9 ACQUIRED HYPOTHYROIDISM: ICD-10-CM

## 2024-12-02 DIAGNOSIS — I10 PRIMARY HYPERTENSION: ICD-10-CM

## 2024-12-02 DIAGNOSIS — E66.01 MORBID (SEVERE) OBESITY DUE TO EXCESS CALORIES (MULTI): Primary | ICD-10-CM

## 2024-12-02 PROBLEM — R39.9 UTI SYMPTOMS: Status: RESOLVED | Noted: 2024-05-01 | Resolved: 2024-12-02

## 2024-12-02 PROBLEM — E66.813 OBESITY, CLASS III, BMI 40-49.9 (MORBID OBESITY): Status: RESOLVED | Noted: 2019-12-06 | Resolved: 2024-12-02

## 2024-12-02 PROCEDURE — 3078F DIAST BP <80 MM HG: CPT | Performed by: INTERNAL MEDICINE

## 2024-12-02 PROCEDURE — 1036F TOBACCO NON-USER: CPT | Performed by: INTERNAL MEDICINE

## 2024-12-02 PROCEDURE — 3008F BODY MASS INDEX DOCD: CPT | Performed by: INTERNAL MEDICINE

## 2024-12-02 PROCEDURE — 99214 OFFICE O/P EST MOD 30 MIN: CPT | Performed by: INTERNAL MEDICINE

## 2024-12-02 PROCEDURE — 3074F SYST BP LT 130 MM HG: CPT | Performed by: INTERNAL MEDICINE

## 2024-12-02 ASSESSMENT — ENCOUNTER SYMPTOMS
FEVER: 0
COUGH: 0
SHORTNESS OF BREATH: 0
FATIGUE: 0

## 2024-12-02 NOTE — PROGRESS NOTES
"Subjective   Ayah Corral is a 49 y.o. female who presents for 6 month Follow-up.    HPI   Monitoring BP occasionally.  Ave: 120-130/60-70.  Denies adverse sx's r/t HTN.    Tolerating Semaglutide   Denies adverse se's.  Weight plateauing    Review of Systems   Constitutional:  Negative for fatigue and fever.   Respiratory:  Negative for cough and shortness of breath.    Cardiovascular:  Negative for chest pain and leg swelling.   All other systems reviewed and are negative.      Health Maintenance Due   Topic Date Due    HIV Screening  Never done    MMR Vaccines (1 of 1 - Standard series) Never done    Hepatitis C Screening  Never done    Lung Cancer Screening  Never done    Diabetes: Hemoglobin A1C  06/03/2024    Diabetes Screening  06/03/2024    Hepatitis B Vaccines (2 of 2 - CpG 2-dose series) 10/11/2024       Objective   /76   Pulse 80   Temp 36.7 °C (98.1 °F)   Resp 16   Ht 1.676 m (5' 6\")   Wt 105 kg (231 lb)   SpO2 100%   BMI 37.28 kg/m²     Physical Exam  Vitals and nursing note reviewed.   Constitutional:       Appearance: Normal appearance.   HENT:      Head: Normocephalic.   Eyes:      Conjunctiva/sclera: Conjunctivae normal.      Pupils: Pupils are equal, round, and reactive to light.   Cardiovascular:      Rate and Rhythm: Normal rate and regular rhythm.      Pulses: Normal pulses.      Heart sounds: Normal heart sounds.   Pulmonary:      Effort: Pulmonary effort is normal.      Breath sounds: Normal breath sounds.   Musculoskeletal:         General: No swelling.      Cervical back: Neck supple.   Skin:     General: Skin is warm and dry.   Neurological:      General: No focal deficit present.      Mental Status: She is oriented to person, place, and time.         Assessment/Plan   Problem List Items Addressed This Visit       Primary hypertension    Mixed hyperlipidemia    Acquired hypothyroidism    Morbid (severe) obesity due to excess calories (Multi) - Primary       Cont meds  Will " increase semaglutide  Cont thyroid  Reviewed labs  Recheck labs at next visit  Cont working on diet and exercise  Stable based on symptoms and exam.  Continue established treatment plan and follow up at least yearly.

## 2025-01-02 ENCOUNTER — APPOINTMENT (OUTPATIENT)
Dept: PRIMARY CARE | Facility: CLINIC | Age: 50
End: 2025-01-02
Payer: COMMERCIAL

## 2025-02-18 RX ORDER — OMEPRAZOLE 20 MG/1
20 TABLET, DELAYED RELEASE ORAL
COMMUNITY

## 2025-02-18 NOTE — PROGRESS NOTES
Subjective     Date: 3/7/2025 Time: 10:19 AM  Name: Ayah Corral  MRN: [id number]    This is a 49 y.o. female with morbid obesity (Body mass index is 37.92 kg/m².) who presents to clinic for consideration of bariatric surgery. she has attempted and failed multiple diet and exercise regimens for weight loss. Initial Onset of obesity was in childhood.  Their goal for surgery is to  be healthier  and lose weight. The patient has tried multiple diets to lose weight including vegan, keto, weight watchers, noom, Omada, semaglutide . The patient was most successful with the Medications; semaglutide . The most pounds lost on this diet were 60 lbs. The patient considers their dietary weakness to be  carbs, sweets, and snacking  The patient reports a  highest weight ever of 276 pounds, lowest weight ever of 160 pounds, and frequent weight loss and weight gain. Distribution of Obesity: is central.  The patient does not exercise     Comorbidities: depressed moodtakes medications, esophageal reflux, high cholesterol, and hypertension controlled with oral meds, hypothyroid  Patient Active Problem List   Diagnosis    Primary hypertension    Mixed hyperlipidemia    Acquired hypothyroidism    Allergic rhinitis due to pollen    Depression    Vitamin D deficiency    Recurrent major depressive disorder, in partial remission ([facility])    Prediabetes    Morbid (severe) obesity due to excess calories (Multi)    Obesity (BMI 35.0-39.9 without comorbidity)       Toshia-en-Y Gastric Bypass    2 = Symptoms noticeable and bothersome but not every day controlled on daily PPI     PMH:   Past Medical History:   Diagnosis Date    Allergic 2013    Depression 12/2002    Disease of thyroid gland 2008?    Eczema 5/2020    Hypertension     Hypothyroid         PSH:   Past Surgical History:   Procedure Laterality Date    WISDOM TOOTH EXTRACTION  10/2011        Denies personal/family hx of VTE.    FAMILY HISTORY:  Family History   Problem Relation Name Age  of Onset    Hearing loss Mother Nelda     Hypertension Mother Nelda     Alcohol abuse Father Duane     Hypertension Father Duane     Alcohol abuse Maternal Grandfather Ed     Cancer Maternal Grandmother Leslie     Cancer Paternal Grandmother Alyssa     Breast cancer Paternal Grandmother Sells     Diabetes Sister Amina         SOCIAL HISTORY:  Social History     Tobacco Use    Smoking status: Former     Current packs/day: 0.00     Average packs/day: 1 pack/day for 28.0 years (28.0 ttl pk-yrs)     Types: Cigarettes     Start date: 1988     Quit date: 3/17/2016     Years since quittin.9    Smokeless tobacco: Never   Vaping Use    Vaping status: Never Used   Substance Use Topics    Alcohol use: Not Currently     Alcohol/week: 1.0 standard drink of alcohol     Types: 1 Standard drinks or equivalent per week     Comment: 1 to 2 drinks per month    Drug use: Never       MEDICATIONS:  Prior to Admission Medications:  Medication Documentation Review Audit       Reviewed by Maria Isabel Olivas CMA (Medical Assistant) on 25 at 1017      Medication Order Taking? Sig Documenting Provider Last Dose Status   cholecalciferol (Vitamin D3) 5,000 Units tablet [id number] Yes Take 1 tab by mouth daily. Historical Provider, MD  Active   escitalopram (Lexapro) 10 mg tablet [id number] Yes Take 1 tablet (10 mg) by mouth once daily.   Patient taking differently: Take 1.5 tablets (15 mg) by mouth once daily.    Vicky Mayes DO  Active   fexofenadine HCl (ALLEGRA ORAL) [id number] Yes Take 1 tablet by mouth once daily. Historical Provider, MD  Active   hydroCHLOROthiazide (HYDRODiuril) 25 mg tablet [id number] Yes Take 1 tablet (25 mg) by mouth once daily. Historical Provider, MD  Active   levothyroxine (Synthroid, Levoxyl) 125 mcg tablet [id number] Yes Take 1.5 tabs by mouth 2 days/week and 1 tab by mouth 5 days/week.   Patient taking differently: Take 1.5 tabs by mouth 1 day/week and 1 tab by mouth 6 days/week.    Vicky Mayes DO  Active  "  omega 3-dha-epa-fish oil 1,000 mg (120 mg-180 mg) capsule [id number] Yes Take 1 capsule (1,000 mg) by mouth once daily. Historical Provider, MD  Active   omeprazole OTC (PriLOSEC OTC) 20 mg EC tablet [id number] Yes Take 1 tablet (20 mg) by mouth once daily in the morning. Take before meals. Do not crush, chew, or split. Historical Provider, MD  Active     Discontinued 03/07/25 1013   VITAMIN B COMPLEX ORAL [id number] Yes Take 1 tablet by mouth once daily. Historical Provider, MD  Active                     ALLERGIES:  Allergies   Allergen Reactions    Sulfamethoxazole-Trimethoprim Rash and Swelling       REVIEW OF SYSTEMS:  GENERAL: Negative for malaise, significant weight loss and fever  HEAD: Negative for headache, swelling.  NECK: Negative for lumps, goiter, pain and significant neck swelling  RESPIRATORY: Negative for cough, wheezing or shortness of breath.  CARDIOVASCULAR: Negative for chest pain, leg swelling or palpitations.  GI: Negative for abdominal discomfort, blood in stools or black stools or change in bowel habits  : No history of dysuria, frequency or incontinence  MUSCULOSKELETAL: Negative for joint pain or swelling, back pain or muscle pain.  SKIN: Negative for lesions, rash, and itching.  PSYCH: Negative for sleep disturbance, mood disorder and recent psychosocial stressors.  ENDOCRINE: Negative for cold or heat intolerance, polyuria, polydipsia and goiter.    Objective   PHYSICAL EXAM:  Visit Vitals  /87 (BP Location: Left arm, Patient Position: Sitting, BP Cuff Size: Large adult long)   Pulse 72   Ht 1.664 m (5' 5.5\")   Wt 105 kg (231 lb 6.4 oz)   SpO2 97%   BMI 37.92 kg/m²   OB Status Postmenopausal   Smoking Status Former   BSA 2.2 m²     General appearance: obese, NAD  Neuro: AOx3  Head: EOMI; no swelling or lesions of scalp or face  ENT:  no lumps or lymphadenopathy, thyroid normal to palpation; oropharynx clear, no swelling or erythema  Skin: warm, no erythema or rashes  Lungs: clear to " percussion and auscultation  Heart: regular rhythm and S1, S2 normal  Abdomen: soft, non-tender, no masses, no organomegaly  Extremities: Normal exam of the extremities. No swelling or pain.  Psych: no hurried speech, no flight of ideas, normal affect    Assessment/Plan   IMPRESSION:  Aayh Corral is a 49 y.o. female with a BMI of Body mass index is 37.92 kg/m². with the following diagnoses and co-morbidities:     Past Medical History:   Diagnosis Date    Allergic 2013    Depression 12/2002    Disease of thyroid gland 2008?    Eczema 5/2020    Hypertension     Hypothyroid        This patient does meet the criteria for a surgical weight loss procedure according to NIH guidelines.    Patient has GERD issues for last 20 years or so and has been on PPI for a long time.  She cannot stop her PPI otherwise symptoms get worse.    The risks of sleeve gastrectomy, Toshia-en-Y gastric bypass surgery including but not limited to bleeding, leak along staple lines, infection, dehydration, ulcers, internal hernia, DVT/PE, pneumonia, myocardial infarction, prolonged nausea/vomiting, incomplete resolution of associated medical conditions, reflux, weight regain, vitamin/mineral deficiencies, and death have been explained to the patient and Ayah Corral has expressed understanding and acceptance of them.     We discussed the lifestyle changes necessary to be successful following surgery.    She wants to proceed with gastric bypass with be a better choice for her reflux issues.    The increased risk of substance and alcohol abuse following bariatric surgery was discussed with the patient, along with the negative consequences of substance/alcohol use after surgery including addiction, worsening of mental health disorders, and injury to the stomach. The risk of smoking and vaping (tobacco or any other substance) after bariatric surgery was explained to the patient. This includes risk of anastamotic ulcers, gastritis, bleeding,  perforation, stricture, and PO intolerance.  The patient expressed understanding and acceptance of these risks.      The possible benefits of the above surgeries including weight loss, improvement/resolution of associated medical and mental health conditions, improved mobility, and decreased mortality have been explained the the patient and Ayah Corral has expressed understanding and acceptance of them.      PLAN:  The plan of treatment for Ayah Corral is to continue with the consultations and tests ordered today in hopes of qualifying for pre-operative clearance for bariatric surgery. This includes:    Consult Nutrition for education and 6 months of MSWL  Consult Psychology  Consult Cardiology  Consult Pulmonology  Labs ordered  EGD  PCP for medical optimization  Consult sleep medicine - concern for DONG  Recommend at least 10-20 lbs of weight loss prior to surgery.

## 2025-02-24 PROBLEM — E66.9 OBESITY (BMI 35.0-39.9 WITHOUT COMORBIDITY): Status: ACTIVE | Noted: 2025-02-24

## 2025-03-06 NOTE — PROGRESS NOTES
Surgeon: Ariana  Patient is considering: Gastric bypas     ASSESSMENT:  Current weight: 231.3  Ht:  65.5 in   BMI: 37.9          Initial start weight: 231.3  Pre-Op Excess Body Weight (EBW):   76.3  Target Post-Op weight goal:         Food allergies/intolerances:  NONE  Chewing/Swallowing/Dentition:  no issues   Diarrhea/ Constipation:  none  Exercise level:   none  Smoking/Tobacco use: none, quit in 2016  Vitamins/Minerals supplements:   B12, D, fish oil  Past diet attempts:  keto, low silvano, low carb, vegan/veg  Hours of sleep/night: 7    24 HOUR RECALL/DIET HISTORY:  Breakfast:  sausage, egg and cheese burrito  Snack:    Lunch:   cheeseburger and onion rings  Snack:   Dinner:  1.5 cups  pasta w/roasted tomtatoes and cheese  Snack:   Beverages:    4 oz soymilk w/cocoa /day,   8 oz reg pop 2x/week,  8 oz coffee/day, 42 oz water/day  Alcohol:  none    Person responsible for cooking & shopping?  Pt does both  How often do you eat sweet snacks?    3x/week  How often do you eat savory snacks?   2x/week  How often do you eat out?    1x/week; fast food, cafeteria food, restaurants  Do you feel overly stuffed?    Yes 1x/week  Binge Eating? none  Night Eating?   No     Emotional Eating?    Yes when stressed out or depressed;  go to is carbs, occurs weekly       READINESS TO LEARN:  Motivation to learn: Interested        Understanding of instruction: Good   Anticipated Compliance: Good   Family Support: Pt's sister is supportive    Educational Materials Provided:    Schedules for MSWL class and support group   Calorie meal plan   Goals sheet    Nutrition assessment completed today.  Pt will be scheduled for a video education class at a later date to discuss the 2 week pre op diet, post op protein and fluid goals, vitamin and mineral supplementation, exercise goals, and post op diet progression.     Patient is seeking gastric bypass  surgery.      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     Pt has had an  issue with weight since childhood.     Carbs are her weakness    Recommend following  1500 calorie meal plan.  Recommend eating 3 meals that include 3-4 oz protein and 2 high protein  snacks.  Discussed meals and snack options.  Reviewed the postop behaviors to start practicing.  Set a goal to exercise for 30 min 3x/week.  Suggested You Tube exercise videos.     Patient was receptive to nutritional recommendations, asked numerous questions, and verbalized understanding of the weight loss surgery diet.  Patient expressed understanding about the importance of strict dietary compliance post-surgery to avoid nutritional deficiencies and achieve optimal weight loss and verbalized intent to follow dietary recommendations.    Malnutrition Screening:  Significant unintentional weight loss? n/a   Eating less than 75% of usual intake for more than 2 weeks? n/a      Nutrition Diagnosis:   1. Overweight/obesity related to excess energy intake as evidenced by BMI = 35 kg/m^2.  2. Food- and nutrition-related knowledge deficit related to lack of prior exposure to surgical weight loss information as evidenced by pt new to surgical program.    Nutrition Interventions:   1. Modify type and amount of food and nutrients within meals and snacks.  2. Comprehensive Nutrition Education    Recommendations:  1. Begin following your meal plan.  Measure and record intake daily.   2. Structure meal patterns, eating three meals and 1-2 snacks per day.  3. Aim for 3-4 oz protein per meal.  Have 2 high protein snacks that are 10-20 g protein each.  You can try a tuna or chicken packet, Greek yogurt, 2 string cheeses, Protein bars like Quest, Pure Protein, Premier, or Built Bars. you can also try protein chips form Quest or Atkins.    4. Drink 64oz of calorie-free, caffeine-free, and non-carbonated beverages. Stop drinking pop  5. Practice no drinking 30 minutes before meals, nothing with meals and wait 30 minutes after meals to drink again. Make  meals last at least 20-30 minutes-chew thoroughly.   6. Limit or omit eating out/sweets/savory snacks to 1-2 times per week.  7. Begin daily multivitamin.  You can try Centrum Adult tablet.   8. Begin doing exercise of choice for 30 min 3x/week. Try You Tube exercise videos.  Increase physical activity by 10-15 minutes as tolerated to an end goal of 60 minutes 5 x per week. Consistency is the key.  9. Attend monthly Zoom bariatric support groups.      Pre-op Goal weight: lose 5% of body weight    Nutrition Monitoring and Evaluation: 1-2 pound weight loss per week  Criteria: weight check  Need for Follow-up:     Patient does meet National Institutes Health guidelines for weight loss surgery, however needs to demonstrate consistent effort in making dietary changes before giving clearance. It is anticipated that the patient will need at least 2 nutritional follow-up visits prior to clearance for surgery.      Patient meets NIH guidelines for weight loss surgery and has been thoroughly evaluated and educated on good dietary practices. Patient is capable of following these guidelines pre-and post-surgically.  From nutrition standpoint, the patient is cleared for weight loss surgery.  If the patient desires, may continue to follow-up with the dietitian on a monthly basis until all surgical requirements are met.    Yancy Amaya RD, LD, [facility]  [phone]

## 2025-03-07 ENCOUNTER — NUTRITION (OUTPATIENT)
Dept: SURGERY | Facility: CLINIC | Age: 50
End: 2025-03-07
Payer: COMMERCIAL

## 2025-03-07 ENCOUNTER — OFFICE VISIT (OUTPATIENT)
Dept: SURGERY | Facility: CLINIC | Age: 50
End: 2025-03-07
Payer: COMMERCIAL

## 2025-03-07 VITALS
HEART RATE: 72 BPM | HEIGHT: 66 IN | OXYGEN SATURATION: 97 % | BODY MASS INDEX: 37.19 KG/M2 | SYSTOLIC BLOOD PRESSURE: 146 MMHG | WEIGHT: 231.4 LBS | DIASTOLIC BLOOD PRESSURE: 87 MMHG

## 2025-03-07 DIAGNOSIS — R73.03 PREDIABETES: ICD-10-CM

## 2025-03-07 DIAGNOSIS — E66.01 MORBID OBESITY (MULTI): ICD-10-CM

## 2025-03-07 DIAGNOSIS — Z13.21 ENCOUNTER FOR VITAMIN DEFICIENCY SCREENING: ICD-10-CM

## 2025-03-07 DIAGNOSIS — Z71.3 ENCOUNTER FOR NUTRITION EVALUATION PRIOR TO BARIATRIC SURGERY: ICD-10-CM

## 2025-03-07 DIAGNOSIS — I10 PRIMARY HYPERTENSION: ICD-10-CM

## 2025-03-07 DIAGNOSIS — Z98.84 BARIATRIC SURGERY STATUS: ICD-10-CM

## 2025-03-07 DIAGNOSIS — E66.9 OBESITY (BMI 35.0-39.9 WITHOUT COMORBIDITY): Primary | ICD-10-CM

## 2025-03-07 DIAGNOSIS — K21.9 CHRONIC GERD: ICD-10-CM

## 2025-03-07 DIAGNOSIS — E78.2 MIXED HYPERLIPIDEMIA: ICD-10-CM

## 2025-03-07 PROCEDURE — 99215 OFFICE O/P EST HI 40 MIN: CPT | Performed by: SURGERY

## 2025-03-07 PROCEDURE — 1036F TOBACCO NON-USER: CPT | Performed by: SURGERY

## 2025-03-07 PROCEDURE — 99205 OFFICE O/P NEW HI 60 MIN: CPT | Performed by: SURGERY

## 2025-03-07 PROCEDURE — 3079F DIAST BP 80-89 MM HG: CPT | Performed by: SURGERY

## 2025-03-07 PROCEDURE — 3077F SYST BP >= 140 MM HG: CPT | Performed by: SURGERY

## 2025-03-07 PROCEDURE — 3008F BODY MASS INDEX DOCD: CPT | Performed by: SURGERY

## 2025-03-07 ASSESSMENT — PATIENT HEALTH QUESTIONNAIRE - PHQ9
1. LITTLE INTEREST OR PLEASURE IN DOING THINGS: NOT AT ALL
SUM OF ALL RESPONSES TO PHQ9 QUESTIONS 1 AND 2: 0
2. FEELING DOWN, DEPRESSED OR HOPELESS: NOT AT ALL

## 2025-03-16 LAB
ALBUMIN SERPL-MCNC: 4.2 G/DL (ref 3.6–5.1)
ALP SERPL-CCNC: 83 U/L (ref 37–153)
ALT SERPL-CCNC: 20 U/L (ref 6–29)
AMPHETAMINES UR QL: NEGATIVE NG/ML
ANION GAP SERPL CALCULATED.4IONS-SCNC: 10 MMOL/L (CALC) (ref 7–17)
APTT PPP: 27 SEC (ref 23–32)
AST SERPL-CCNC: 18 U/L (ref 10–35)
BARBITURATES UR QL: NEGATIVE NG/ML
BASOPHILS # BLD AUTO: 53 CELLS/UL (ref 0–200)
BASOPHILS NFR BLD AUTO: 0.6 %
BENZODIAZ UR QL: NEGATIVE NG/ML
BILIRUB SERPL-MCNC: 0.3 MG/DL (ref 0.2–1.2)
BUN SERPL-MCNC: 16 MG/DL (ref 7–25)
BZE UR QL: NEGATIVE NG/ML
C PEPTIDE SERPL-MCNC: 4.48 NG/ML (ref 0.8–3.85)
CALCIUM SERPL-MCNC: 9.5 MG/DL (ref 8.6–10.4)
CHLORIDE SERPL-SCNC: 104 MMOL/L (ref 98–110)
CO2 SERPL-SCNC: 27 MMOL/L (ref 20–32)
COPPER BLD-MCNC: NORMAL UG/DL
COTININE SERPL-MCNC: NORMAL NG/ML
CREAT SERPL-MCNC: 0.62 MG/DL (ref 0.5–1.03)
CREAT UR-MCNC: 97.6 MG/DL
EGFRCR SERPLBLD CKD-EPI 2021: 108 ML/MIN/1.73M2
EOSINOPHIL # BLD AUTO: 282 CELLS/UL (ref 15–500)
EOSINOPHIL NFR BLD AUTO: 3.2 %
ERYTHROCYTE [DISTWIDTH] IN BLOOD BY AUTOMATED COUNT: 14.4 % (ref 11–15)
EST. AVERAGE GLUCOSE BLD GHB EST-MCNC: NORMAL MG/DL
EST. AVERAGE GLUCOSE BLD GHB EST-SCNC: NORMAL MMOL/L
FERRITIN SERPL-MCNC: 51 NG/ML (ref 16–232)
FOLATE SERPL-MCNC: 9.3 NG/ML
GLUCOSE SERPL-MCNC: 93 MG/DL (ref 65–99)
HBA1C MFR BLD: NORMAL %
HCT VFR BLD AUTO: 43 % (ref 35–45)
HGB BLD-MCNC: 14 G/DL (ref 11.7–15.5)
INR PPP: 1
IRON SATN MFR SERPL: 11 % (CALC) (ref 16–45)
IRON SERPL-MCNC: 42 MCG/DL (ref 45–160)
LYMPHOCYTES # BLD AUTO: 3247 CELLS/UL (ref 850–3900)
LYMPHOCYTES NFR BLD AUTO: 36.9 %
MCH RBC QN AUTO: 29 PG (ref 27–33)
MCHC RBC AUTO-ENTMCNC: 32.6 G/DL (ref 32–36)
MCV RBC AUTO: 89.2 FL (ref 80–100)
METHADONE UR QL: NEGATIVE NG/ML
MONOCYTES # BLD AUTO: 607 CELLS/UL (ref 200–950)
MONOCYTES NFR BLD AUTO: 6.9 %
NEUTROPHILS # BLD AUTO: 4611 CELLS/UL (ref 1500–7800)
NEUTROPHILS NFR BLD AUTO: 52.4 %
NICOTINE SERPL-MCNC: NORMAL NG/ML
OPIATES UR QL: NEGATIVE NG/ML
OXIDANTS UR QL: NEGATIVE MCG/ML
OXYCODONE UR QL: NEGATIVE NG/ML
PCP UR QL: NEGATIVE NG/ML
PH UR: 9 [PH] (ref 4.5–9)
PLATELET # BLD AUTO: 329 THOUSAND/UL (ref 140–400)
PMV BLD REES-ECKER: 12.1 FL (ref 7.5–12.5)
POTASSIUM SERPL-SCNC: 4.1 MMOL/L (ref 3.5–5.3)
PROT SERPL-MCNC: 6.8 G/DL (ref 6.1–8.1)
PROTHROMBIN TIME: 10.4 SEC (ref 9–11.5)
PTH-INTACT SERPL-MCNC: 61 PG/ML (ref 16–77)
QUEST NOTES AND COMMENTS: NORMAL
RBC # BLD AUTO: 4.82 MILLION/UL (ref 3.8–5.1)
SODIUM SERPL-SCNC: 141 MMOL/L (ref 135–146)
T4 FREE SERPL-MCNC: 1.2 NG/DL (ref 0.8–1.8)
THC UR QL: NEGATIVE NG/ML
TIBC SERPL-MCNC: 371 MCG/DL (CALC) (ref 250–450)
TSH SERPL-ACNC: 1.98 MIU/L
UREA BREATH TEST QL: NORMAL
VIT A SERPL-MCNC: NORMAL UG/ML
VIT B1 BLD-SCNC: NORMAL NMOL/L
WBC # BLD AUTO: 8.8 THOUSAND/UL (ref 3.8–10.8)
ZINC SERPL-MCNC: NORMAL UG/ML

## 2025-03-23 LAB
ALBUMIN SERPL-MCNC: 4.2 G/DL (ref 3.6–5.1)
ALP SERPL-CCNC: 83 U/L (ref 37–153)
ALT SERPL-CCNC: 20 U/L (ref 6–29)
AMPHETAMINES UR QL: NEGATIVE NG/ML
ANION GAP SERPL CALCULATED.4IONS-SCNC: 10 MMOL/L (CALC) (ref 7–17)
APTT PPP: 27 SEC (ref 23–32)
AST SERPL-CCNC: 18 U/L (ref 10–35)
BARBITURATES UR QL: NEGATIVE NG/ML
BASOPHILS # BLD AUTO: 53 CELLS/UL (ref 0–200)
BASOPHILS NFR BLD AUTO: 0.6 %
BENZODIAZ UR QL: NEGATIVE NG/ML
BILIRUB SERPL-MCNC: 0.3 MG/DL (ref 0.2–1.2)
BUN SERPL-MCNC: 16 MG/DL (ref 7–25)
BZE UR QL: NEGATIVE NG/ML
C PEPTIDE SERPL-MCNC: 4.48 NG/ML (ref 0.8–3.85)
CALCIUM SERPL-MCNC: 9.5 MG/DL (ref 8.6–10.4)
CHLORIDE SERPL-SCNC: 104 MMOL/L (ref 98–110)
CO2 SERPL-SCNC: 27 MMOL/L (ref 20–32)
COPPER BLD-MCNC: NORMAL UG/DL
COTININE SERPL-MCNC: <2 NG/ML
CREAT SERPL-MCNC: 0.62 MG/DL (ref 0.5–1.03)
CREAT UR-MCNC: 97.6 MG/DL
EGFRCR SERPLBLD CKD-EPI 2021: 108 ML/MIN/1.73M2
EOSINOPHIL # BLD AUTO: 282 CELLS/UL (ref 15–500)
EOSINOPHIL NFR BLD AUTO: 3.2 %
ERYTHROCYTE [DISTWIDTH] IN BLOOD BY AUTOMATED COUNT: 14.4 % (ref 11–15)
EST. AVERAGE GLUCOSE BLD GHB EST-MCNC: 111 MG/DL
EST. AVERAGE GLUCOSE BLD GHB EST-SCNC: 6.2 MMOL/L
FERRITIN SERPL-MCNC: 51 NG/ML (ref 16–232)
FOLATE SERPL-MCNC: 9.3 NG/ML
GLUCOSE SERPL-MCNC: 93 MG/DL (ref 65–99)
HBA1C MFR BLD: 5.5 % OF TOTAL HGB
HCT VFR BLD AUTO: 43 % (ref 35–45)
HGB BLD-MCNC: 14 G/DL (ref 11.7–15.5)
INR PPP: 1
IRON SATN MFR SERPL: 11 % (CALC) (ref 16–45)
IRON SERPL-MCNC: 42 MCG/DL (ref 45–160)
LYMPHOCYTES # BLD AUTO: 3247 CELLS/UL (ref 850–3900)
LYMPHOCYTES NFR BLD AUTO: 36.9 %
MCH RBC QN AUTO: 29 PG (ref 27–33)
MCHC RBC AUTO-ENTMCNC: 32.6 G/DL (ref 32–36)
MCV RBC AUTO: 89.2 FL (ref 80–100)
METHADONE UR QL: NEGATIVE NG/ML
MONOCYTES # BLD AUTO: 607 CELLS/UL (ref 200–950)
MONOCYTES NFR BLD AUTO: 6.9 %
NEUTROPHILS # BLD AUTO: 4611 CELLS/UL (ref 1500–7800)
NEUTROPHILS NFR BLD AUTO: 52.4 %
NICOTINE SERPL-MCNC: <2 NG/ML
OPIATES UR QL: NEGATIVE NG/ML
OXIDANTS UR QL: NEGATIVE MCG/ML
OXYCODONE UR QL: NEGATIVE NG/ML
PCP UR QL: NEGATIVE NG/ML
PH UR: 9 [PH] (ref 4.5–9)
PLATELET # BLD AUTO: 329 THOUSAND/UL (ref 140–400)
PMV BLD REES-ECKER: 12.1 FL (ref 7.5–12.5)
POTASSIUM SERPL-SCNC: 4.1 MMOL/L (ref 3.5–5.3)
PROT SERPL-MCNC: 6.8 G/DL (ref 6.1–8.1)
PROTHROMBIN TIME: 10.4 SEC (ref 9–11.5)
PTH-INTACT SERPL-MCNC: 61 PG/ML (ref 16–77)
QUEST NOTES AND COMMENTS: NORMAL
RBC # BLD AUTO: 4.82 MILLION/UL (ref 3.8–5.1)
SODIUM SERPL-SCNC: 141 MMOL/L (ref 135–146)
T4 FREE SERPL-MCNC: 1.2 NG/DL (ref 0.8–1.8)
THC UR QL: NEGATIVE NG/ML
TIBC SERPL-MCNC: 371 MCG/DL (CALC) (ref 250–450)
TSH SERPL-ACNC: 1.98 MIU/L
UREA BREATH TEST QL: NOT DETECTED
VIT A SERPL-MCNC: 66 MCG/DL (ref 38–98)
VIT B1 BLD-SCNC: 217 NMOL/L (ref 78–185)
WBC # BLD AUTO: 8.8 THOUSAND/UL (ref 3.8–10.8)
ZINC SERPL-MCNC: 47 MCG/DL (ref 60–130)

## 2025-03-24 LAB
ALBUMIN SERPL-MCNC: 4.2 G/DL (ref 3.6–5.1)
ALP SERPL-CCNC: 83 U/L (ref 37–153)
ALT SERPL-CCNC: 20 U/L (ref 6–29)
AMPHETAMINES UR QL: NEGATIVE NG/ML
ANION GAP SERPL CALCULATED.4IONS-SCNC: 10 MMOL/L (CALC) (ref 7–17)
APTT PPP: 27 SEC (ref 23–32)
AST SERPL-CCNC: 18 U/L (ref 10–35)
BARBITURATES UR QL: NEGATIVE NG/ML
BASOPHILS # BLD AUTO: 53 CELLS/UL (ref 0–200)
BASOPHILS NFR BLD AUTO: 0.6 %
BENZODIAZ UR QL: NEGATIVE NG/ML
BILIRUB SERPL-MCNC: 0.3 MG/DL (ref 0.2–1.2)
BUN SERPL-MCNC: 16 MG/DL (ref 7–25)
BZE UR QL: NEGATIVE NG/ML
C PEPTIDE SERPL-MCNC: 4.48 NG/ML (ref 0.8–3.85)
CALCIUM SERPL-MCNC: 9.5 MG/DL (ref 8.6–10.4)
CHLORIDE SERPL-SCNC: 104 MMOL/L (ref 98–110)
CO2 SERPL-SCNC: 27 MMOL/L (ref 20–32)
COPPER BLD-MCNC: 110 MCG/DL
COTININE SERPL-MCNC: <2 NG/ML
CREAT SERPL-MCNC: 0.62 MG/DL (ref 0.5–1.03)
CREAT UR-MCNC: 97.6 MG/DL
EGFRCR SERPLBLD CKD-EPI 2021: 108 ML/MIN/1.73M2
EOSINOPHIL # BLD AUTO: 282 CELLS/UL (ref 15–500)
EOSINOPHIL NFR BLD AUTO: 3.2 %
ERYTHROCYTE [DISTWIDTH] IN BLOOD BY AUTOMATED COUNT: 14.4 % (ref 11–15)
EST. AVERAGE GLUCOSE BLD GHB EST-MCNC: 111 MG/DL
EST. AVERAGE GLUCOSE BLD GHB EST-SCNC: 6.2 MMOL/L
FERRITIN SERPL-MCNC: 51 NG/ML (ref 16–232)
FOLATE SERPL-MCNC: 9.3 NG/ML
GLUCOSE SERPL-MCNC: 93 MG/DL (ref 65–99)
HBA1C MFR BLD: 5.5 % OF TOTAL HGB
HCT VFR BLD AUTO: 43 % (ref 35–45)
HGB BLD-MCNC: 14 G/DL (ref 11.7–15.5)
INR PPP: 1
IRON SATN MFR SERPL: 11 % (CALC) (ref 16–45)
IRON SERPL-MCNC: 42 MCG/DL (ref 45–160)
LYMPHOCYTES # BLD AUTO: 3247 CELLS/UL (ref 850–3900)
LYMPHOCYTES NFR BLD AUTO: 36.9 %
MCH RBC QN AUTO: 29 PG (ref 27–33)
MCHC RBC AUTO-ENTMCNC: 32.6 G/DL (ref 32–36)
MCV RBC AUTO: 89.2 FL (ref 80–100)
METHADONE UR QL: NEGATIVE NG/ML
MONOCYTES # BLD AUTO: 607 CELLS/UL (ref 200–950)
MONOCYTES NFR BLD AUTO: 6.9 %
NEUTROPHILS # BLD AUTO: 4611 CELLS/UL (ref 1500–7800)
NEUTROPHILS NFR BLD AUTO: 52.4 %
NICOTINE SERPL-MCNC: <2 NG/ML
OPIATES UR QL: NEGATIVE NG/ML
OXIDANTS UR QL: NEGATIVE MCG/ML
OXYCODONE UR QL: NEGATIVE NG/ML
PCP UR QL: NEGATIVE NG/ML
PH UR: 9 [PH] (ref 4.5–9)
PLATELET # BLD AUTO: 329 THOUSAND/UL (ref 140–400)
PMV BLD REES-ECKER: 12.1 FL (ref 7.5–12.5)
POTASSIUM SERPL-SCNC: 4.1 MMOL/L (ref 3.5–5.3)
PROT SERPL-MCNC: 6.8 G/DL (ref 6.1–8.1)
PROTHROMBIN TIME: 10.4 SEC (ref 9–11.5)
PTH-INTACT SERPL-MCNC: 61 PG/ML (ref 16–77)
QUEST NOTES AND COMMENTS: NORMAL
RBC # BLD AUTO: 4.82 MILLION/UL (ref 3.8–5.1)
SODIUM SERPL-SCNC: 141 MMOL/L (ref 135–146)
T4 FREE SERPL-MCNC: 1.2 NG/DL (ref 0.8–1.8)
THC UR QL: NEGATIVE NG/ML
TIBC SERPL-MCNC: 371 MCG/DL (CALC) (ref 250–450)
TSH SERPL-ACNC: 1.98 MIU/L
UREA BREATH TEST QL: NOT DETECTED
VIT A SERPL-MCNC: 66 MCG/DL (ref 38–98)
VIT B1 BLD-SCNC: 217 NMOL/L (ref 78–185)
WBC # BLD AUTO: 8.8 THOUSAND/UL (ref 3.8–10.8)
ZINC SERPL-MCNC: 47 MCG/DL (ref 60–130)

## 2025-04-01 DIAGNOSIS — E61.1 IRON DEFICIENCY: ICD-10-CM

## 2025-04-01 RX ORDER — ASCORBIC ACID 250 MG
TABLET ORAL
Qty: 90 TABLET | Refills: 0 | Status: SHIPPED | OUTPATIENT
Start: 2025-04-01

## 2025-04-01 RX ORDER — FERROUS SULFATE 325(65) MG
325 TABLET, DELAYED RELEASE (ENTERIC COATED) ORAL DAILY
Qty: 90 TABLET | Refills: 0 | Status: SHIPPED | OUTPATIENT
Start: 2025-04-01 | End: 2025-06-30

## 2025-04-02 ENCOUNTER — NUTRITION (OUTPATIENT)
Dept: SURGERY | Facility: CLINIC | Age: 50
End: 2025-04-02
Payer: COMMERCIAL

## 2025-04-02 NOTE — PROGRESS NOTES
S:  Pt packs meals for works.  She has started drinking a smoothie for breakfast.  She practices the 30-30-30 rule.  She is  working on eating slowly and chewing well.    Pt uses [employer] to track calories.  She does well during the week but tends to have some     Diet  recall:   B:  smoothie (almond milk, Greek yogurt and greens and carrots, berries)  S:  English muffin with egg white turkey sausage and cheese   L:  turkey sandwich w/pino on low carb bread w/tomato and greens  S: apple, brownie  D:  turkey sandwich w/pino on low carb bread w/tomato and greens   S:  3 meatballs w/marinara  Beverages:  1 c coffee, 30 oz CL, 12 oz water, smoothie    O:    Wt:  --     Ht:    65.5 in          BMI:     Goal: 5% body weight loss over the course of program    Dietary recommendation:   1. Try to increase your water intake.   2. Continue to practice the 30-30-30 rule by drinking between meals.  Work on eating slower and chew your food well.   3. Continue to have 3 meals and 2 snacks daily.  Make a breakfast sandwich at home and eat your yogurt and berries for a mid-morning snack.  Add some veggies to lunch.  When dining out choose the grilled chicken instead.   4. Continue to have balanced meals that always contain a good source of protein.  5. Increase intake of non-starchy vegetables.  Have 5 servings fruits and vegetables daily.   6.  Start walking for 30 min 3x/week.  Use your walking pad when the weather is bad.   Increase physical activity by 10-15 minutes to an end goal of 60 minutes 5 x per week.    A/P:   Pt is doing well eating 3 meals per day.  She has do some unplanned eating. Recommend that she make a breakfast sandwich at home and have yogurt for her mid morning snack.  Discussed making better choices on the weekend when dining out.   Pt set a goal for exercise.       Exercise:  some  leisurely walking nothing consistent     Yancy Amaya RD, LD

## 2025-04-04 NOTE — PROGRESS NOTES
Department of Medicine  Division of Pulmonary, Critical Care, and Sleep Medicine  Consultation  [facility] - [address], [address]    I was asked by Surjit Lane MD, to evaluate Ms. Ayah Corral for pre-operative clearance. I have independently interviewed and examined the patient in the office and reviewed available records.    Physician HPI:  The patient is a 49 year old woman who presents today to establish care in our pulmonology office for pre-operative evaluation prior to bariatric surgery. The patient has an extensive past medical history, most significantly, morbid obesity BMI 38 as well as hypertension and hyperlipidemia. The patient also has an extensive tobacco use history, 28 PPD. She does not have signs of symptoms of COPD. We will obtain complete pulmonary function testing as well as polysomnography to evaluation for DONG. The patient will be referred for lung cancer screening assessment. The patient otherwise doing well without respiratory symptoms. The patient will follow-up closely in 2 month's time.     The patient reports loud snoring with episodic gasping, excessive daytime sleepiness, morning headaches and can fall asleep while watching TV.     Review of Systems   All other review of systems are negative and/or non-contributory.      Immunizations:  Immunization History   Administered Date(s) Administered    COVID-19, mRNA, LNP-S, PF, 30 mcg/0.3 mL dose 12/21/2020, 01/11/2021, 09/27/2021    Flu vaccine, quadrivalent, no egg protein, age 6 month or greater (FLUCELVAX) 09/23/2022, 10/13/2023    Influenza, Unspecified 10/05/2020    Influenza, injectable, quadrivalent 10/12/2021    Influenza, seasonal, injectable 01/01/2014, 10/01/2022    Moderna COVID-19 vaccine, 12 years and older (50mcg/0.5mL)(Spikevax) 09/13/2024    Pfizer COVID-19 vaccine, 12 years and older, (30mcg/0.3mL) (Comirnaty) 10/13/2023    Pfizer COVID-19 vaccine, bivalent, age 12 years and older (30 mcg/0.3 mL) 09/23/2022     Tdap vaccine, age 7 year and older (BOOSTRIX, ADACEL) 2012, 10/18/2022    Zoster vaccine, recombinant, adult (SHINGRIX) 10/13/2023, 2023       Past Medical History:  Past Medical History:   Diagnosis Date    Allergic 2013    Depression 2002    Disease of thyroid gland ?    Eczema 2020    Hypertension     Hypothyroid        Past Surgical History:  Past Surgical History:   Procedure Laterality Date    WISDOM TOOTH EXTRACTION  10/2011       Family History:  Family History   Problem Relation Name Age of Onset    Hearing loss Mother Nelda     Hypertension Mother Nelda     Alcohol abuse Father Duane     Hypertension Father Duane     Alcohol abuse Maternal Grandfather Ed     Cancer Maternal Grandmother Leslie     Cancer Paternal Grandmother Petrolia     Breast cancer Paternal Grandmother Alyssa     Diabetes Sister Amina        Social History:  Social History     Tobacco Use    Smoking status: Former     Current packs/day: 0.00     Average packs/day: 1 pack/day for 28.0 years (28.0 ttl pk-yrs)     Types: Cigarettes     Start date: 1988     Quit date: 3/17/2016     Years since quittin.0    Smokeless tobacco: Never   Vaping Use    Vaping status: Never Used   Substance Use Topics    Alcohol use: Not Currently     Alcohol/week: 1.0 standard drink of alcohol     Types: 1 Standard drinks or equivalent per week     Comment: 1 to 2 drinks per month    Drug use: Never        Occupational & Environmental History:   Nurse at [employer]     Medications:  Current Outpatient Medications   Medication Instructions    ascorbic acid (Vitamin C) 250 mg tablet Take daily with iron    cholecalciferol (Vitamin D3) 5,000 Units tablet Take 1 tab by mouth daily.    escitalopram (LEXAPRO) 10 mg, oral, Daily    ferrous sulfate 325 (65 Fe) mg EC tablet 1 tablet, oral, Daily, Do not crush, chew, or split.    fexofenadine HCl (ALLEGRA ORAL) 1 tablet, Daily RT    hydroCHLOROthiazide (HYDRODiuril) 25 mg tablet 1 tablet, Daily     "iron,carb/vit C/vit B12/folic (IRON 100 PLUS ORAL) oral    levothyroxine (Synthroid, Levoxyl) 125 mcg tablet Take 1.5 tabs by mouth 2 days/week and 1 tab by mouth 5 days/week.    omega 3-dha-epa-fish oil 1,000 mg (120 mg-180 mg) capsule 1 capsule, Daily RT    omeprazole OTC (PRILOSEC OTC) 20 mg, Daily before breakfast    VITAMIN B COMPLEX ORAL 1 tablet, Daily RT        Drug Allergies/Intolerances:  Allergies   Allergen Reactions    Sulfamethoxazole-Trimethoprim Rash and Swelling            Visit Vitals  /83   Pulse 70   Resp 18   Ht 1.664 m (5' 5.5\")   Wt 105 kg (230 lb 6.4 oz)   SpO2 95%   BMI 37.76 kg/m²   OB Status Postmenopausal   Smoking Status Former   BSA 2.2 m²        Physical Exam     Pulmonary Function Test Results       Chest Radiograph     No results found for this or any previous visit from the past 2000 days.      Echocardiogram     No results found for this or any previous visit from the past 365 days.       Chest CT Scan     No results found for this or any previous visit from the past 365 days.       Laboratory Studies     Lab Results   Component Value Date    WBC 8.8 03/15/2025    HGB 14.0 03/15/2025    HCT 43.0 03/15/2025    MCV 89.2 03/15/2025     03/15/2025      Lab Results   Component Value Date    GLUCOSE 93 03/15/2025    CALCIUM 9.5 03/15/2025     03/15/2025    K 4.1 03/15/2025    CO2 27 03/15/2025     03/15/2025    BUN 16 03/15/2025    CREATININE 0.62 03/15/2025      Lab Results   Component Value Date    ALT 20 03/15/2025    AST 18 03/15/2025    ALKPHOS 83 03/15/2025    BILITOT 0.3 03/15/2025        PT   Date/Time Value Ref Range Status   03/15/2025 09:19 AM 10.4 9.0 - 11.5 sec Final     INR   Date/Time Value Ref Range Status   03/15/2025 09:19 AM 1.0  Final     Comment:     Reference Range                     0.9-1.1  Moderate-intensity Warfarin Therapy 2.0-3.0  Higher-intensity Warfarin Therapy   3.0-4.0             No results found for: \"ICIGE\", \"IGE\", \"ICA04\", " "\"ASPFU\", \"IGG\", \"IGA\", \"IGM\"    Sputum Culture     No results found for: \"AFBCX\"   No results found for: \"RESPCULTCYFI\"    No results found for the last 90 days.          Assessment and Plan / Recommendations   Pre-Operative Evaluation for GI Bypass  Extensive tobacco use, r/o COPD  Excessive daytime sleepiness, r/o DONG  Essential Hypertension    Plan:  -The patient will need complete PFT in anticipation of surgery/tobacco use  -Polysomnography to r/o DONG prior to surgery  -Lung cancer screening assessment  -F/U closely in 1-2 months time     Tiago Bullock DO  04/08/2025    "

## 2025-04-08 ENCOUNTER — APPOINTMENT (OUTPATIENT)
Facility: CLINIC | Age: 50
End: 2025-04-08
Payer: COMMERCIAL

## 2025-04-08 VITALS
WEIGHT: 230.4 LBS | OXYGEN SATURATION: 95 % | DIASTOLIC BLOOD PRESSURE: 83 MMHG | HEART RATE: 70 BPM | SYSTOLIC BLOOD PRESSURE: 120 MMHG | HEIGHT: 66 IN | BODY MASS INDEX: 37.03 KG/M2 | RESPIRATION RATE: 18 BRPM

## 2025-04-08 DIAGNOSIS — Z98.84 BARIATRIC SURGERY STATUS: ICD-10-CM

## 2025-04-08 DIAGNOSIS — E66.9 OBESITY (BMI 35.0-39.9 WITHOUT COMORBIDITY): ICD-10-CM

## 2025-04-08 DIAGNOSIS — J44.89 COPD (CHRONIC OBSTRUCTIVE PULMONARY DISEASE) WITH CHRONIC BRONCHITIS (MULTI): ICD-10-CM

## 2025-04-08 DIAGNOSIS — F17.200 TOBACCO USE DISORDER: Primary | ICD-10-CM

## 2025-04-08 PROCEDURE — 3074F SYST BP LT 130 MM HG: CPT | Performed by: INTERNAL MEDICINE

## 2025-04-08 PROCEDURE — 3079F DIAST BP 80-89 MM HG: CPT | Performed by: INTERNAL MEDICINE

## 2025-04-08 PROCEDURE — G8433 SCR FOR DEP NOT CPT DOC RSN: HCPCS | Performed by: INTERNAL MEDICINE

## 2025-04-08 PROCEDURE — 99204 OFFICE O/P NEW MOD 45 MIN: CPT | Performed by: INTERNAL MEDICINE

## 2025-04-08 PROCEDURE — 3008F BODY MASS INDEX DOCD: CPT | Performed by: INTERNAL MEDICINE

## 2025-04-08 PROCEDURE — 1036F TOBACCO NON-USER: CPT | Performed by: INTERNAL MEDICINE

## 2025-04-08 ASSESSMENT — COLUMBIA-SUICIDE SEVERITY RATING SCALE - C-SSRS
6. HAVE YOU EVER DONE ANYTHING, STARTED TO DO ANYTHING, OR PREPARED TO DO ANYTHING TO END YOUR LIFE?: NO
1. IN THE PAST MONTH, HAVE YOU WISHED YOU WERE DEAD OR WISHED YOU COULD GO TO SLEEP AND NOT WAKE UP?: NO
2. HAVE YOU ACTUALLY HAD ANY THOUGHTS OF KILLING YOURSELF?: NO

## 2025-04-08 ASSESSMENT — ENCOUNTER SYMPTOMS
LOSS OF SENSATION IN FEET: 0
OCCASIONAL FEELINGS OF UNSTEADINESS: 0
DEPRESSION: 0

## 2025-04-08 ASSESSMENT — PATIENT HEALTH QUESTIONNAIRE - PHQ9
2. FEELING DOWN, DEPRESSED OR HOPELESS: NOT AT ALL
SUM OF ALL RESPONSES TO PHQ9 QUESTIONS 1 AND 2: 0
1. LITTLE INTEREST OR PLEASURE IN DOING THINGS: NOT AT ALL

## 2025-04-09 ENCOUNTER — APPOINTMENT (OUTPATIENT)
Dept: SLEEP MEDICINE | Facility: CLINIC | Age: 50
End: 2025-04-09
Payer: COMMERCIAL

## 2025-04-09 DIAGNOSIS — G47.30 SLEEP-DISORDERED BREATHING: Primary | ICD-10-CM

## 2025-04-09 DIAGNOSIS — Z01.818 PREOPERATIVE CLEARANCE: ICD-10-CM

## 2025-04-09 DIAGNOSIS — E66.9 OBESITY (BMI 35.0-39.9 WITHOUT COMORBIDITY): ICD-10-CM

## 2025-04-09 DIAGNOSIS — Z98.84 BARIATRIC SURGERY STATUS: ICD-10-CM

## 2025-04-09 PROCEDURE — 1036F TOBACCO NON-USER: CPT | Performed by: PSYCHIATRY & NEUROLOGY

## 2025-04-09 PROCEDURE — 99204 OFFICE O/P NEW MOD 45 MIN: CPT | Performed by: PSYCHIATRY & NEUROLOGY

## 2025-04-09 NOTE — PROGRESS NOTES
Patient: Ayah Corral    [id number]  : 1975 -- AGE 49 y.o.    Provider: James Roque MD     Location [facility]   Service Date: 2025              [facility]  New Visit Note      Virtual or Telephone Consent  An interactive audio and video telecommunication system which permits real time communications between the patient (at the originating site) and provider (at the distant site) was utilized to provide this telehealth service.   Verbal consent was requested and obtained from Ayah Corral on this date, 25 for a telehealth visit and the patient's location was confirmed at the time of the visit.  I verified the patient's identity and physical location in [place].  If this is a new patient to me, I informed the patient of my name and type of active [place] license that I hold.        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    No bothersome nighttime symptoms, but has daytime sleepiness. At least 2 years of daytime sleepiness. Got better after she stopped drinking coffee, but she started drinking it again because she likes the taste. She has daily bothersome sleepiness and she can start to nod off at her desk at work. Safe to drive, however.    Her  passed away 2 years ago, but he would tell her that she snored.    NIGHTTIME SYMPTOMS:   Snoring: nightly, at least moderately loud, started many years ago, worse with aging and probably worse with weight gain, definitely worse when supine, so she tries to avoid supine sleep  Witnessed apnea: years ago one time her  noticed this  Nocturnal gasping: rare  Nocturnal choking: No  Sleep walking: No  Sleep talking:  No  Dream enactment: No  Bruxism: No  Nocturnal  "excessive sweating: occasional  Nocturnal GERD: yes, treating with Prilosec  Morning headaches: occasional  Morning dry mouth/sore throat: occasional dry mouth/sore throat  Nocturia: once/night, easily returns to sleep  Sleep paralysis: No  Dreaming at sleep onset: about once/week  Hypnagogic/hypnopompic hallucinations: No  Bedroom environment is conducive to sleep: Yes    DAYTIME SYMPTOMS  [place]: 17/24  Sleepiness - as above  Fatigue: no  Feeling sleepy while driving: Denies    RLS symptoms: rare, when \"really tired\" when sitting in chair at night, no effects on sleep ability in bed   Bed partner mentions pt kicks in sleep: No    Cataplexy: No    SLEEP HABITS:   Self-described morning person  Preferred sleep position: side  Bedtime: work nights 9 pm, days off 11 pm, sleep latency within 30 min  Wake time: 5 am for work, 6 am on days off  Napping: no  Total estimated sleep per 24 hrs: about 7 hours    PRIOR SLEEP STUDIES:  No      Patient Active Problem List   Diagnosis    Primary hypertension    Mixed hyperlipidemia    Acquired hypothyroidism    Allergic rhinitis due to pollen    Depression    Vitamin D deficiency    Recurrent major depressive disorder, in partial remission (CMS-[facility])    Prediabetes    Morbid (severe) obesity due to excess calories (Multi)    Obesity (BMI 35.0-39.9 without comorbidity)     Past Medical History:   Diagnosis Date    Allergic 2013    Depression 12/2002    Disease of thyroid gland 2008?    Eczema 5/2020    Hypertension     Hypothyroid      Past Surgical History:   Procedure Laterality Date    WISDOM TOOTH EXTRACTION  10/2011     Current Outpatient Medications   Medication Sig Dispense Refill    ascorbic acid (Vitamin C) 250 mg tablet Take daily with iron 90 tablet 0    cholecalciferol (Vitamin D3) 5,000 Units tablet Take 1 tab by mouth daily.      escitalopram (Lexapro) 10 mg tablet Take 1 tablet (10 mg) by mouth once daily. (Patient taking differently: Take 1.5 tablets (15 mg) by " "mouth once daily.) 90 tablet 3    ferrous sulfate 325 (65 Fe) mg EC tablet Take 1 tablet by mouth once daily. Do not crush, chew, or split. 90 tablet 0    fexofenadine HCl (ALLEGRA ORAL) Take 1 tablet by mouth once daily.      hydroCHLOROthiazide (HYDRODiuril) 25 mg tablet Take 1 tablet (25 mg) by mouth once daily.      iron,carb/vit C/vit B12/folic (IRON 100 PLUS ORAL) Take by mouth.      levothyroxine (Synthroid, Levoxyl) 125 mcg tablet Take 1.5 tabs by mouth 2 days/week and 1 tab by mouth 5 days/week. (Patient taking differently: Take 1.5 tabs by mouth 1 day/week and 1 tab by mouth 6 days/week.) 96 tablet 3    omega 3-dha-epa-fish oil 1,000 mg (120 mg-180 mg) capsule Take 1 capsule (1,000 mg) by mouth once daily.      omeprazole OTC (PriLOSEC OTC) 20 mg EC tablet Take 1 tablet (20 mg) by mouth once daily in the morning. Take before meals. Do not crush, chew, or split.       No current facility-administered medications for this visit.     Allergies   Allergen Reactions    Sulfamethoxazole-Trimethoprim Rash and Swelling         Family History   Problem Relation Name Age of Onset    Hearing loss Mother Nelda     Hypertension Mother Nelda     Alcohol abuse Father Duane     Hypertension Father Duane     Alcohol abuse Maternal Grandfather Ed     Cancer Maternal Grandmother Leslie     Cancer Paternal Grandmother Alyssa     Breast cancer Paternal Grandmother Alyssa     Diabetes Sister Amina        SOCIAL HISTORY  Employment: nurse at [employer] in [employer]  Lives with: alone   Alcohol: once every few months   Cigarettes: quit in 2017  Illicits: no     ROS: 12 point ROS positive for fatigue, nasal congestion, and weight loss - lost ~50 lbs from [employer], was on it for about a year, stopped working after about 6 months.    PHYSICAL EXAMINATION:   Height reported at 5' 5\", weight 226 lbs  General: Awake. Alert. Comfortable. No apparent distress.   Speech: Normal  Comprehension: Normal  Mood: Stable  Affect: Appropriate  Eyes:   " Eyelids: normal            ENT:          Unable to assess patency of nasal passageways or for presence of nasal septum deviation. Unable to clearly view posterior oropharynx to assess tonsils, uvula, and Magana tongue score. Tongue scalloping is present, tongue is mildly enlarged,  Retrognathia is not present. Dentition very good.           Neck:          Circumference: increased in caliber  Cardiac:  unable to assess pulses or cardiac rate/rhythm.   Pul:         Normal respiratory effort   Abd:         obese  Neuro: Alert, well-oriented. Cranial nerves II-XII grossly normal and symmetric. No abnormal movements noted.       LABS/DIAGNOSTICS:  Lab Results   Component Value Date    HGB 14.0 03/15/2025    CO2 27 03/15/2025    TSH 1.98 03/15/2025    FREET4 1.2 03/15/2025    HGBA1C 5.5 03/15/2025    FERRITIN 51 03/15/2025    IRON 42 (L) 03/15/2025    TIBC 371 03/15/2025    IRONSAT 11 (L) 03/15/2025    VITD25 55 11/30/2024    [id number] 531 11/30/2024          ASSESSMENT AND PLAN: Ms. Ayah Corral is a 49 y.o. female with a history of snoring and excessive daytime sleepiness and she is going through the bariatric surgery process. High risk for DONG with STOP-BANG of at least 5/8. Due to the potential for increased perioperative risks in the bariatric surgery population, testing for sleep apnea and treating if needed prior to surgery is recommended.        #sleep disordered breathing  -We discussed the risk factors for sleep apnea, pathophysiology of sleep apnea, treatment options, and potential long-term complications of untreated DONG, including cardiovascular and metabolic complications. We will start evaluation with a home sleep test. She is scheduled to  a home sleep testing kit on 4/29, but due to inconvenience we will switch her to an mailout study through [facility]    #obesity  #preoperative clearance  #bariatric surgery status  -HST  -clearance depends on sleep study results and if CPAP compliance is  needed        All of the above was discussed with the patient in detail. She voiced an understanding of the above and was agreeable to proceed further as advised. Procedure for the sleep study was discussed with her.      FOLLOW UP:  After study to discuss results

## 2025-04-09 NOTE — Clinical Note
Isiah Cristobal, could you please cancel the HST pickup date for April 29 for the [facility] lab? Pt wants [facility] instead. Thanks!

## 2025-04-14 DIAGNOSIS — I10 PRIMARY HYPERTENSION: Primary | ICD-10-CM

## 2025-04-14 DIAGNOSIS — E03.9 ACQUIRED HYPOTHYROIDISM: ICD-10-CM

## 2025-04-14 DIAGNOSIS — F33.41 RECURRENT MAJOR DEPRESSIVE DISORDER, IN PARTIAL REMISSION (CMS-HCC): ICD-10-CM

## 2025-04-14 RX ORDER — HYDROCHLOROTHIAZIDE 25 MG/1
25 TABLET ORAL DAILY
Qty: 90 TABLET | Refills: 3 | Status: SHIPPED | OUTPATIENT
Start: 2025-04-14 | End: 2026-04-14

## 2025-04-14 RX ORDER — LEVOTHYROXINE SODIUM 125 UG/1
TABLET ORAL
Qty: 96 TABLET | Refills: 3 | Status: SHIPPED | OUTPATIENT
Start: 2025-04-14

## 2025-04-14 RX ORDER — ESCITALOPRAM OXALATE 10 MG/1
15 TABLET ORAL DAILY
Qty: 135 TABLET | Refills: 3 | Status: SHIPPED | OUTPATIENT
Start: 2025-04-14 | End: 2026-04-14

## 2025-04-16 ENCOUNTER — APPOINTMENT (OUTPATIENT)
Dept: CARDIOLOGY | Facility: CLINIC | Age: 50
End: 2025-04-16
Payer: COMMERCIAL

## 2025-04-16 VITALS
DIASTOLIC BLOOD PRESSURE: 77 MMHG | SYSTOLIC BLOOD PRESSURE: 119 MMHG | WEIGHT: 231.8 LBS | HEART RATE: 70 BPM | BODY MASS INDEX: 37.25 KG/M2 | HEIGHT: 66 IN

## 2025-04-16 DIAGNOSIS — E66.9 OBESITY (BMI 35.0-39.9 WITHOUT COMORBIDITY): ICD-10-CM

## 2025-04-16 DIAGNOSIS — I10 PRIMARY HYPERTENSION: ICD-10-CM

## 2025-04-16 DIAGNOSIS — Z01.810 PREOPERATIVE CARDIOVASCULAR EXAMINATION: Primary | ICD-10-CM

## 2025-04-16 DIAGNOSIS — E78.2 MIXED HYPERLIPIDEMIA: ICD-10-CM

## 2025-04-16 DIAGNOSIS — Z98.84 BARIATRIC SURGERY STATUS: ICD-10-CM

## 2025-04-16 DIAGNOSIS — Z87.891 FORMER SMOKER: ICD-10-CM

## 2025-04-16 PROCEDURE — 3008F BODY MASS INDEX DOCD: CPT | Performed by: INTERNAL MEDICINE

## 2025-04-16 PROCEDURE — 99203 OFFICE O/P NEW LOW 30 MIN: CPT | Performed by: INTERNAL MEDICINE

## 2025-04-16 PROCEDURE — 3078F DIAST BP <80 MM HG: CPT | Performed by: INTERNAL MEDICINE

## 2025-04-16 PROCEDURE — 3074F SYST BP LT 130 MM HG: CPT | Performed by: INTERNAL MEDICINE

## 2025-04-16 PROCEDURE — 93000 ELECTROCARDIOGRAM COMPLETE: CPT | Performed by: INTERNAL MEDICINE

## 2025-04-16 NOTE — PROGRESS NOTES
CARDIOLOGY NEW PATIENT OFFICE VISIT    Patient:  Ayah Corral  YOB: 1975  MRN: [id number]       Chief Complaint:      No chief complaint on file.      History of Present Illness:     Ayah Corral is a 49 y.o. female who is being seen today at the request of Dr. Vicky Mayes for evaluation of preoperative cardiac status prior to planned gastric bypass surgery in the near future.  She does not have any history of atherosclerotic heart or valvular heart disease.  She has a history of hypertension, prediabetes, and mixed hyperlipidemia.  She has a history of morbid obesity and prior tobacco abuse.  She stopped smoking tobacco in 2017.  She works full-time as a nurse.      Overall she has been feeling well.  She generally is healthy and active without any significant limitations.  She can go up and down several flights of stairs without any difficulties.  She denies any recent chest pain or shortness breath.  She denies any orthopnea, PND, or increasing peripheral edema.  She denies any palpitations, lightheadedness, near-syncope, or syncope.  She denies any fever, chills, or cough.  She denies any nausea, vomiting, or diaphoresis.  She denies any hemoptysis, hematemesis, melena, or hematochezia.  She does have a history of loud snoring, excessive sleepiness, and morning headaches.      She was seen by pulmonary and a CT lung screening scan as well as a sleep study are currently pending.  Lab studies March 15, 2025 showed a normal CBC and CMP.  Lipid profile November 30, 2024 showed a cholesterol 225, , HDL 44, and triglycerides 315.  EKG in the office today shows normal sinus rhythm, low QRS voltage, otherwise normal.    Overall she is well compensated from a cardiac standpoint.  She does not have any anginal or CHF symptoms.  No active cardiac dysrhythmias.  Her blood pressures are well-controlled.  Her EKG shows normal sinus rhythm without any significant ST changes.  Her functional capacity  is in excess of 6 METS.  No indication for additional preoperative cardiac testing.  She is scheduled for a screening CT scan of the lungs.  If significant coronary artery calcifications noted she could be considered for eventual exercise treadmill test.  Overall she has reasonable risk to proceed with bariatric surgery as planned.  Other details as noted below.    The above portion of this note was dictated by me using voice recognition software.  I personally performed the services described in the documentation.  The scribe entering the documentation below was in my presence.  I affirm that the information is both accurate and complete.      Allergies:     Allergies[1]       Outpatient Medications:     Current Outpatient Medications   Medication Instructions    ascorbic acid (Vitamin C) 250 mg tablet Take daily with iron    cholecalciferol (Vitamin D3) 5,000 Units tablet Take 1 tab by mouth daily.    escitalopram (LEXAPRO) 15 mg, oral, Daily    ferrous sulfate 325 (65 Fe) mg EC tablet 1 tablet, oral, Daily, Do not crush, chew, or split.    fexofenadine HCl (ALLEGRA ORAL) 1 tablet, Daily RT    hydroCHLOROthiazide (HYDRODIURIL) 25 mg, oral, Daily    iron,carb/vit C/vit B12/folic (IRON 100 PLUS ORAL) oral    levothyroxine (Synthroid, Levoxyl) 125 mcg tablet Take 1.5 tabs by mouth 2 days/week and 1 tab by mouth 5 days/week.    omega 3-dha-epa-fish oil 1,000 mg (120 mg-180 mg) capsule 1 capsule, Daily RT    omeprazole OTC (PRILOSEC OTC) 20 mg, Daily before breakfast        Past Medical History:     Medical History[2]      Social History:     Social History[3]      Family History:   Family History[4]      Review of Systems:     12 point review of systems completed and is negative other than that detailed above.       Objective:     Vitals:    04/16/25 1455   BP: 119/77   Pulse: 70       Wt Readings from Last 4 Encounters:   04/08/25 105 kg (230 lb 6.4 oz)   03/07/25 105 kg (231 lb 6.4 oz)   12/02/24 105 kg (231 lb)    08/16/24 103 kg (227 lb)       Physical Examination:   GENERAL:  Well developed, well nourished, in no acute distress.  CHEST:  Symmetric and nontender.  NEURO/PSYCH:  Alert and oriented times three with approppriate behavior and responses.  NECK:  Supple, no JVD, no bruit.  LUNGS:  Clear to auscultation bilaterally, normal respiratory effort.  HEART:  Rate and rhythm regular with no evident murmur, no gallop appreciated.        There are no rubs, clicks or heaves.  EXTREMITIES:  Warm with good color, no clubbing or cyanosis.  There is no edema noted.  PERIPHERAL VASCULAR:  Pulses present and equally palpable; 2+ throughout.      Lab:     CBC:   Lab Results   Component Value Date    WBC 8.8 03/15/2025    RBC 4.82 03/15/2025    HGB 14.0 03/15/2025    HCT 43.0 03/15/2025     03/15/2025        CMP:    Lab Results   Component Value Date     03/15/2025    K 4.1 03/15/2025     03/15/2025    CO2 27 03/15/2025    BUN 16 03/15/2025    CREATININE 0.62 03/15/2025    GLUCOSE 93 03/15/2025    CALCIUM 9.5 03/15/2025       Lipid Profile:    Lab Results   Component Value Date    TRIG 315 (H) 11/30/2024    HDL 43.9 11/30/2024    LDLCALC 118 (H) 11/30/2024       BMP:  Lab Results   Component Value Date     03/15/2025     11/30/2024    K 4.1 03/15/2025    K 4.3 11/30/2024     03/15/2025     11/30/2024    CO2 27 03/15/2025    CO2 31 11/30/2024    BUN 16 03/15/2025    BUN 18 11/30/2024    CREATININE 0.62 03/15/2025    CREATININE 0.65 11/30/2024       CBC:  Lab Results   Component Value Date    WBC 8.8 03/15/2025    WBC 8.4 11/30/2024    RBC 4.82 03/15/2025    RBC 4.59 11/30/2024    HGB 14.0 03/15/2025    HGB 13.4 11/30/2024    HCT 43.0 03/15/2025    HCT 42.8 11/30/2024    MCV 89.2 03/15/2025    MCV 93 11/30/2024    MCH 29.0 03/15/2025    MCH 29.2 11/30/2024    MCHC 32.6 03/15/2025    MCHC 31.3 (L) 11/30/2024    RDW 14.4 03/15/2025    RDW 14.6 (H) 11/30/2024     03/15/2025      11/30/2024    MPV 12.1 03/15/2025       Hepatic Function Panel:    Lab Results   Component Value Date    ALKPHOS 83 03/15/2025    ALT 20 03/15/2025    AST 18 03/15/2025    PROT 6.8 03/15/2025    BILITOT 0.3 03/15/2025       TSH:    Lab Results   Component Value Date    TSH 1.98 03/15/2025       PT/INR:    Lab Results   Component Value Date    PROTIME 10.4 03/15/2025    INR 1.0 03/15/2025       HgBA1c:    Lab Results   Component Value Date    HGBA1C 5.5 03/15/2025       Assessment:     Problem List Items Addressed This Visit           ICD-10-CM    Primary hypertension I10    Mixed hyperlipidemia E78.2    Obesity (BMI 35.0-39.9 without comorbidity) E66.9    Preoperative cardiovascular examination - Primary Z01.810    Relevant Orders    ECG 12 lead (Clinic Performed)    Follow Up In Cardiology    Former smoker Z87.891     Other Visit Diagnoses         Codes      Bariatric surgery status     Z98.84          IMaria Isabel MA   am scribing for, and in the presence of Dr. Farhat Marie MD, [facility].    I, Dr. Farhat Marie MD, FACC, personally performed the services described in the documentation as scribed by Maria Isabel Raphael MA   in my presence, and confirm it is both accurate and complete.      Provider Attestation - Scribe documentation    The above portion of this note was dictated by me using voice recognition software.  All medical record entries made by the scribe were at my direction.  The scribe entering the documentation was in my presence.   I have reviewed the chart and agree that the record accurately reflects my personal performance of the history, physical exam, discussion and plan.             [1]   Allergies  Allergen Reactions    Sulfamethoxazole-Trimethoprim Rash and Swelling   [2]   Past Medical History:  Diagnosis Date    Allergic 2013    Depression 12/2002    Disease of thyroid gland 2008?    Eczema 5/2020    Hypertension     Hypothyroid    [3]   Social History  Tobacco Use    Smoking status: Former      Current packs/day: 0.00     Average packs/day: 1 pack/day for 28.0 years (28.0 ttl pk-yrs)     Types: Cigarettes     Start date: 1988     Quit date: 3/17/2016     Years since quittin.0    Smokeless tobacco: Never   Vaping Use    Vaping status: Never Used   Substance Use Topics    Alcohol use: Not Currently     Alcohol/week: 1.0 standard drink of alcohol     Types: 1 Standard drinks or equivalent per week     Comment: once every few months    Drug use: Never   [4]   Family History  Problem Relation Name Age of Onset    Hearing loss Mother Nelda     Hypertension Mother Nelda     Alcohol abuse Father Duane     Hypertension Father Duane     Alcohol abuse Maternal Grandfather Ed     Cancer Maternal Grandmother Leslie     Cancer Paternal Grandmother Alyssa     Breast cancer Paternal Grandmother Alyssa     Diabetes Sister Amina

## 2025-04-20 NOTE — H&P
History Of Present Illness  Ayah Corral is a 49 y.o. female presenting with BMI of 38.  She also has a significant history of gastroesophageal reflux disease.  Patient presents today for a prebariatric EGD in preparation for gastric bypass surgery.     Past Medical History  Medical History[1]    Surgical History  Surgical History[2]     Social History  She reports that she quit smoking about 9 years ago. Her smoking use included cigarettes. She started smoking about 36 years ago. She has a 28 pack-year smoking history. She has never used smokeless tobacco. She reports that she does not currently use alcohol after a past usage of about 1.0 standard drink of alcohol per week. She reports that she does not use drugs.    Family History  Family History[3]     Allergies  Sulfamethoxazole-trimethoprim    Review of Systems  Constitutional: Negative  Gastrointestinal: Positive for gastroesophageal reflux  Respiratory: Negative  Cardiovascular: Negative  Skin: Negative  Musculoskeletal: Negative  Neurological: Negative  Endocrine: Negative  Behavioral: Negative    Physical Exam  Patient is in no acute distress  No respiratory distress  Abdomen is soft, not tender, not distended  No guarding   Patient is alert and oriented x 3    Assessment: 49F patient with morbid obesity and GERD presenting for pre-operative EGD in preparation for bariatric surgery    Plan:  - EGD + biopsy  I spent 60 minutes in the professional and overall care of this patient.    Gilberto Tenorio MD       [1]   Past Medical History:  Diagnosis Date    Allergic 2013    Depression 12/2002    Disease of thyroid gland 2008?    Eczema 5/2020    Hypertension     Hypothyroid    [2]   Past Surgical History:  Procedure Laterality Date    WISDOM TOOTH EXTRACTION  10/2011   [3]   Family History  Problem Relation Name Age of Onset    Hearing loss Mother Nelda     Hypertension Mother Nelda     Alcohol abuse Father Duane     Hypertension Father Duane     Alcohol abuse  Maternal Grandfather Ed     Cancer Maternal Grandmother Leslie     Cancer Paternal Grandmother Coupland     Breast cancer Paternal Grandmother Coupland     Diabetes Sister Amina

## 2025-04-22 ENCOUNTER — HOSPITAL ENCOUNTER (OUTPATIENT)
Dept: RADIOLOGY | Facility: HOSPITAL | Age: 50
Discharge: HOME | End: 2025-04-22
Payer: COMMERCIAL

## 2025-04-22 DIAGNOSIS — E66.9 OBESITY (BMI 35.0-39.9 WITHOUT COMORBIDITY): ICD-10-CM

## 2025-04-22 DIAGNOSIS — F17.200 TOBACCO USE DISORDER: ICD-10-CM

## 2025-04-22 DIAGNOSIS — Z98.84 BARIATRIC SURGERY STATUS: ICD-10-CM

## 2025-04-22 DIAGNOSIS — J44.89 COPD (CHRONIC OBSTRUCTIVE PULMONARY DISEASE) WITH CHRONIC BRONCHITIS (MULTI): ICD-10-CM

## 2025-04-22 PROCEDURE — 71271 CT THORAX LUNG CANCER SCR C-: CPT

## 2025-04-22 PROCEDURE — 71271 CT THORAX LUNG CANCER SCR C-: CPT | Performed by: STUDENT IN AN ORGANIZED HEALTH CARE EDUCATION/TRAINING PROGRAM

## 2025-04-23 ENCOUNTER — ANESTHESIA EVENT (OUTPATIENT)
Dept: GASTROENTEROLOGY | Facility: HOSPITAL | Age: 50
End: 2025-04-23
Payer: COMMERCIAL

## 2025-04-23 ENCOUNTER — HOSPITAL ENCOUNTER (OUTPATIENT)
Dept: GASTROENTEROLOGY | Facility: HOSPITAL | Age: 50
Discharge: HOME | End: 2025-04-23
Payer: COMMERCIAL

## 2025-04-23 ENCOUNTER — ANESTHESIA (OUTPATIENT)
Dept: GASTROENTEROLOGY | Facility: HOSPITAL | Age: 50
End: 2025-04-23
Payer: COMMERCIAL

## 2025-04-23 VITALS
DIASTOLIC BLOOD PRESSURE: 76 MMHG | SYSTOLIC BLOOD PRESSURE: 123 MMHG | RESPIRATION RATE: 18 BRPM | TEMPERATURE: 97.2 F | BODY MASS INDEX: 37.12 KG/M2 | HEART RATE: 72 BPM | OXYGEN SATURATION: 96 % | WEIGHT: 231 LBS | HEIGHT: 66 IN

## 2025-04-23 DIAGNOSIS — Z98.84 BARIATRIC SURGERY STATUS: ICD-10-CM

## 2025-04-23 DIAGNOSIS — E66.9 OBESITY (BMI 35.0-39.9 WITHOUT COMORBIDITY): ICD-10-CM

## 2025-04-23 LAB — GLUCOSE BLD MANUAL STRIP-MCNC: 100 MG/DL (ref 74–99)

## 2025-04-23 PROCEDURE — 3700000002 HC GENERAL ANESTHESIA TIME - EACH INCREMENTAL 1 MINUTE: Performed by: SURGERY

## 2025-04-23 PROCEDURE — 82947 ASSAY GLUCOSE BLOOD QUANT: CPT

## 2025-04-23 PROCEDURE — 7100000010 HC PHASE TWO TIME - EACH INCREMENTAL 1 MINUTE: Performed by: SURGERY

## 2025-04-23 PROCEDURE — 3700000001 HC GENERAL ANESTHESIA TIME - INITIAL BASE CHARGE: Performed by: SURGERY

## 2025-04-23 PROCEDURE — 43239 EGD BIOPSY SINGLE/MULTIPLE: CPT | Performed by: SURGERY

## 2025-04-23 PROCEDURE — 2500000004 HC RX 250 GENERAL PHARMACY W/ HCPCS (ALT 636 FOR OP/ED): Mod: JW | Performed by: NURSE ANESTHETIST, CERTIFIED REGISTERED

## 2025-04-23 PROCEDURE — A43239 PR EDG TRANSORAL BIOPSY SINGLE/MULTIPLE: Performed by: NURSE ANESTHETIST, CERTIFIED REGISTERED

## 2025-04-23 PROCEDURE — 7100000009 HC PHASE TWO TIME - INITIAL BASE CHARGE: Performed by: SURGERY

## 2025-04-23 PROCEDURE — A43239 PR EDG TRANSORAL BIOPSY SINGLE/MULTIPLE: Performed by: ANESTHESIOLOGY

## 2025-04-23 PROCEDURE — 99222 1ST HOSP IP/OBS MODERATE 55: CPT | Performed by: STUDENT IN AN ORGANIZED HEALTH CARE EDUCATION/TRAINING PROGRAM

## 2025-04-23 RX ORDER — LIDOCAINE HCL/PF 100 MG/5ML
SYRINGE (ML) INTRAVENOUS AS NEEDED
Status: DISCONTINUED | OUTPATIENT
Start: 2025-04-23 | End: 2025-04-23

## 2025-04-23 RX ORDER — PROPOFOL 10 MG/ML
INJECTION, EMULSION INTRAVENOUS AS NEEDED
Status: DISCONTINUED | OUTPATIENT
Start: 2025-04-23 | End: 2025-04-23

## 2025-04-23 RX ADMIN — LIDOCAINE HYDROCHLORIDE 100 MG: 20 INJECTION, SOLUTION INTRAVENOUS at 08:45

## 2025-04-23 RX ADMIN — PROPOFOL 140 MCG/KG/MIN: 10 INJECTION, EMULSION INTRAVENOUS at 08:46

## 2025-04-23 RX ADMIN — PROPOFOL 100 MG: 10 INJECTION, EMULSION INTRAVENOUS at 08:45

## 2025-04-23 SDOH — HEALTH STABILITY: MENTAL HEALTH: CURRENT SMOKER: 0

## 2025-04-23 ASSESSMENT — COLUMBIA-SUICIDE SEVERITY RATING SCALE - C-SSRS
2. HAVE YOU ACTUALLY HAD ANY THOUGHTS OF KILLING YOURSELF?: NO
6. HAVE YOU EVER DONE ANYTHING, STARTED TO DO ANYTHING, OR PREPARED TO DO ANYTHING TO END YOUR LIFE?: NO
1. IN THE PAST MONTH, HAVE YOU WISHED YOU WERE DEAD OR WISHED YOU COULD GO TO SLEEP AND NOT WAKE UP?: NO

## 2025-04-23 ASSESSMENT — PAIN SCALES - GENERAL
PAIN_LEVEL: 0
PAINLEVEL_OUTOF10: 1
PAINLEVEL_OUTOF10: 1
PAINLEVEL_OUTOF10: 0 - NO PAIN

## 2025-04-23 ASSESSMENT — PAIN - FUNCTIONAL ASSESSMENT: PAIN_FUNCTIONAL_ASSESSMENT: 0-10

## 2025-04-23 NOTE — ANESTHESIA POSTPROCEDURE EVALUATION
Patient: Ayah Corral    Procedure Summary       Date: 04/23/25 Room / Location: [facility]    Anesthesia Start: 0842 Anesthesia Stop:     Procedure: EGD Diagnosis:       Obesity (BMI 35.0-39.9 without comorbidity)      Bariatric surgery status    Scheduled Providers: Surjit Lane MD; Serafin James MD Responsible Provider: Serafin James MD    Anesthesia Type: MAC ASA Status: 3            Anesthesia Type: MAC    Vitals Value Taken Time   /72 04/23/25 08:56   Temp 36.2 °C (97.2 °F) 04/23/25 08:56   Pulse 64 04/23/25 08:56   Resp 18 04/23/25 08:56   SpO2 97 % 04/23/25 08:56       Anesthesia Post Evaluation    Patient location during evaluation: PACU  Patient participation: complete - patient cannot participate  Level of consciousness: sleepy but conscious  Pain score: 0  Pain management: adequate  Airway patency: patent  Cardiovascular status: acceptable, blood pressure returned to baseline and hemodynamically stable  Respiratory status: acceptable  Hydration status: acceptable  Postoperative Nausea and Vomiting: none      There were no known notable events for this encounter.

## 2025-04-23 NOTE — ANESTHESIA PREPROCEDURE EVALUATION
Patient: Ayah Corral    Procedure Information       Date/Time: 04/23/25 0830    Scheduled providers: Surjit Lane MD; Serafin James MD    Procedure: EGD    Location: [facility]            Relevant Problems   Anesthesia   (-) Difficult intubation   (-) PONV (postoperative nausea and vomiting)      Cardiac   (+) Mixed hyperlipidemia   (+) Primary hypertension      Neuro   (+) Depression   (+) Recurrent major depressive disorder, in partial remission (CMS-HCC)      Endocrine   (+) Acquired hypothyroidism   (+) Morbid (severe) obesity due to excess calories (Multi)       Clinical information reviewed:   Tobacco  Allergies  Meds   Med Hx  Surg Hx   Fam Hx  Soc Hx        NPO Detail:  NPO/Void Status  Date of Last Liquid: 04/23/25  Time of Last Liquid: 0000  Date of Last Solid: 04/23/25  Time of Last Solid: 0000  Last Intake Type: Clear fluids         Physical Exam    Airway  Mallampati: II  TM distance: >3 FB  Neck ROM: full     Cardiovascular - normal exam  Rhythm: regular  Rate: normal     Dental    Pulmonary - normal exam   Abdominal            Anesthesia Plan    History of general anesthesia?: yes  History of complications of general anesthesia?: no    ASA 3     MAC     The patient is not a current smoker.  Education provided regarding risk of obstructive sleep apnea.  intravenous induction   Anesthetic plan and risks discussed with patient.    Plan discussed with CRNA, CAA and attending.

## 2025-04-29 ENCOUNTER — APPOINTMENT (OUTPATIENT)
Dept: SLEEP MEDICINE | Facility: HOSPITAL | Age: 50
End: 2025-04-29
Payer: COMMERCIAL

## 2025-05-02 LAB
LABORATORY COMMENT REPORT: NORMAL
PATH REPORT.FINAL DX SPEC: NORMAL
PATH REPORT.GROSS SPEC: NORMAL
PATH REPORT.RELEVANT HX SPEC: NORMAL
PATH REPORT.TOTAL CANCER: NORMAL

## 2025-05-07 ENCOUNTER — NUTRITION (OUTPATIENT)
Dept: SURGERY | Facility: CLINIC | Age: 50
End: 2025-05-07
Payer: COMMERCIAL

## 2025-05-07 VITALS — BODY MASS INDEX: 35.86 KG/M2 | WEIGHT: 223.1 LBS | HEIGHT: 66 IN

## 2025-05-07 NOTE — PROGRESS NOTES
S:  pt states that she is tracking her intake daily.  She is prepping meals and packing them for work.  She eats 3 meals per day that contain protein.    She practices the 30-30-30 rule.  She eats slowly and chews well.      Diet  recall:   B:  coffee  w/SF creamer and stevia and protein shake   S:  yogurt   L:  3 oz chicken  mixed with 1.2 c cottage cheese, green beans and SF pudding cup   S:   celery and carrots   D:  3 oz strip steak, cauliflower risotto   S:   chocolate protein bar   Beverages:  water, 1 c coffee, CL     O:    Wt:   223.1    Ht:     65.5 in          BMI: 36.6      Goal: 5% body weight loss over the course of program    Dietary recommendation:   1. Wean off of caffeinated beverages  2. Continue to practice the 30-30-30 rule by drinking between meals.  3.Continue to have 3 meals and 1 snack daily.  4. Continue to have balanced meals that always contain a good source of protein.  5.  Add an additional day of exercise.      A/P:  Pt is doing well making changes in her lifestyle and eating habits.  She has lost 8 pounds since her initial assessment.  From a nutritional standpoint, she is cleared for surgery.  She will continue to meet with RD monthly to complete the rest of her MSWL requirement and for accountability.      Exercise:  walking the dog for 30-40 min 3x/week     Yancy Amaya RD, LD

## 2025-05-15 ENCOUNTER — PROCEDURE VISIT (OUTPATIENT)
Dept: SLEEP MEDICINE | Facility: HOSPITAL | Age: 50
End: 2025-05-15
Payer: COMMERCIAL

## 2025-05-15 DIAGNOSIS — Z98.84 BARIATRIC SURGERY STATUS: ICD-10-CM

## 2025-05-15 DIAGNOSIS — G47.30 SLEEP-DISORDERED BREATHING: ICD-10-CM

## 2025-05-15 DIAGNOSIS — Z01.818 PREOPERATIVE CLEARANCE: ICD-10-CM

## 2025-05-15 DIAGNOSIS — E66.9 OBESITY (BMI 35.0-39.9 WITHOUT COMORBIDITY): ICD-10-CM

## 2025-05-15 PROCEDURE — 95806 SLEEP STUDY UNATT&RESP EFFT: CPT | Performed by: PSYCHIATRY & NEUROLOGY

## 2025-05-19 ENCOUNTER — HOSPITAL ENCOUNTER (OUTPATIENT)
Dept: RESPIRATORY THERAPY | Facility: HOSPITAL | Age: 50
Discharge: HOME | End: 2025-05-19
Payer: COMMERCIAL

## 2025-05-19 DIAGNOSIS — F17.200 TOBACCO USE DISORDER: ICD-10-CM

## 2025-05-19 DIAGNOSIS — Z98.84 BARIATRIC SURGERY STATUS: ICD-10-CM

## 2025-05-19 DIAGNOSIS — J44.89 COPD (CHRONIC OBSTRUCTIVE PULMONARY DISEASE) WITH CHRONIC BRONCHITIS (MULTI): ICD-10-CM

## 2025-05-19 DIAGNOSIS — E66.9 OBESITY (BMI 35.0-39.9 WITHOUT COMORBIDITY): ICD-10-CM

## 2025-05-19 PROCEDURE — 94726 PLETHYSMOGRAPHY LUNG VOLUMES: CPT

## 2025-05-19 PROCEDURE — 94729 DIFFUSING CAPACITY: CPT | Performed by: INTERNAL MEDICINE

## 2025-05-19 PROCEDURE — 94726 PLETHYSMOGRAPHY LUNG VOLUMES: CPT | Performed by: INTERNAL MEDICINE

## 2025-05-19 PROCEDURE — 94010 BREATHING CAPACITY TEST: CPT | Performed by: INTERNAL MEDICINE

## 2025-05-21 LAB
MGC ASCENT PFT - FEV1 - PRE: 1.96
MGC ASCENT PFT - FEV1 - PREDICTED: 2.73
MGC ASCENT PFT - FVC - PRE: 2.63
MGC ASCENT PFT - FVC - PREDICTED: 3.38

## 2025-06-04 ENCOUNTER — NUTRITION (OUTPATIENT)
Dept: SURGERY | Facility: CLINIC | Age: 50
End: 2025-06-04
Payer: COMMERCIAL

## 2025-06-04 ENCOUNTER — TELEPHONE (OUTPATIENT)
Dept: SLEEP MEDICINE | Facility: CLINIC | Age: 50
End: 2025-06-04
Payer: COMMERCIAL

## 2025-06-04 VITALS — WEIGHT: 225.2 LBS | BODY MASS INDEX: 36.19 KG/M2 | HEIGHT: 66 IN

## 2025-06-04 NOTE — PROGRESS NOTES
S:   Pt celebrated her bday last month.  She got off track.  She is eating eating 3 meals per day that contain protein.  She has prepping meals ahead of time, trying different recipes.      Diet  recall:   B: protein shake  S:  yogurt   L:  4 oz chicken thigh w/Borsein cheese and cherry tomatoes and 1/2 c   S:  yogurt if not eaten for a AM snack  D:   4 oz strip steak, 10 tater tots, green beans  S:   Sometimes has Atkins chocolate pb cluster  Beverages: CL, 1 c coffee, water      O:    Wt: 225.2      Ht:   65.5 in            BMI: 36.9    Goal: 5% body weight loss over the course of program    Dietary recommendation:   1. Wean off of caffeinated beverages  2. Continue to practice the 30-30-30 rule by drinking between meals.  3.Continue to have 3 meals and 1 snack daily.  4. Continue to have balanced meals that always contain a good source of protein.  5.  Add an additional day of exercise.      A/P:  Recommend that pt work on staying on track and losing weight.  She is doing well with exercise and eating protein at all of her meals.     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

## 2025-06-05 DIAGNOSIS — G47.33 OSA (OBSTRUCTIVE SLEEP APNEA): Primary | ICD-10-CM

## 2025-06-06 ENCOUNTER — APPOINTMENT (OUTPATIENT)
Dept: PRIMARY CARE | Facility: CLINIC | Age: 50
End: 2025-06-06
Payer: COMMERCIAL

## 2025-06-06 VITALS
SYSTOLIC BLOOD PRESSURE: 132 MMHG | HEIGHT: 66 IN | TEMPERATURE: 97.8 F | WEIGHT: 231 LBS | RESPIRATION RATE: 16 BRPM | OXYGEN SATURATION: 100 % | HEART RATE: 72 BPM | BODY MASS INDEX: 37.12 KG/M2 | DIASTOLIC BLOOD PRESSURE: 80 MMHG

## 2025-06-06 DIAGNOSIS — I10 PRIMARY HYPERTENSION: ICD-10-CM

## 2025-06-06 DIAGNOSIS — K11.20 PAROTID SIALADENITIS: ICD-10-CM

## 2025-06-06 DIAGNOSIS — E66.01 MORBID (SEVERE) OBESITY DUE TO EXCESS CALORIES (MULTI): ICD-10-CM

## 2025-06-06 DIAGNOSIS — R73.03 PREDIABETES: ICD-10-CM

## 2025-06-06 DIAGNOSIS — G47.33 OSA (OBSTRUCTIVE SLEEP APNEA): Primary | ICD-10-CM

## 2025-06-06 DIAGNOSIS — E78.2 MIXED HYPERLIPIDEMIA: ICD-10-CM

## 2025-06-06 DIAGNOSIS — F33.41 RECURRENT MAJOR DEPRESSIVE DISORDER, IN PARTIAL REMISSION: ICD-10-CM

## 2025-06-06 DIAGNOSIS — F33.42 RECURRENT MAJOR DEPRESSIVE DISORDER, IN FULL REMISSION: ICD-10-CM

## 2025-06-06 DIAGNOSIS — E66.9 OBESITY (BMI 35.0-39.9 WITHOUT COMORBIDITY): ICD-10-CM

## 2025-06-06 PROCEDURE — 1036F TOBACCO NON-USER: CPT | Performed by: INTERNAL MEDICINE

## 2025-06-06 PROCEDURE — 3075F SYST BP GE 130 - 139MM HG: CPT | Performed by: INTERNAL MEDICINE

## 2025-06-06 PROCEDURE — 3079F DIAST BP 80-89 MM HG: CPT | Performed by: INTERNAL MEDICINE

## 2025-06-06 PROCEDURE — 3008F BODY MASS INDEX DOCD: CPT | Performed by: INTERNAL MEDICINE

## 2025-06-06 PROCEDURE — 99214 OFFICE O/P EST MOD 30 MIN: CPT | Performed by: INTERNAL MEDICINE

## 2025-06-06 RX ORDER — CEPHALEXIN 500 MG/1
500 CAPSULE ORAL 3 TIMES DAILY
Qty: 21 CAPSULE | Refills: 0 | Status: SHIPPED | OUTPATIENT
Start: 2025-06-06 | End: 2025-06-13

## 2025-06-06 RX ORDER — ESCITALOPRAM OXALATE 10 MG/1
15 TABLET ORAL DAILY
Start: 2025-06-06 | End: 2026-06-06

## 2025-06-06 ASSESSMENT — ENCOUNTER SYMPTOMS
FEVER: 0
FATIGUE: 0
SHORTNESS OF BREATH: 0
COUGH: 0

## 2025-06-06 NOTE — PROGRESS NOTES
Patient: Ayah Corral    [id number]  : 1975 -- AGE 50 y.o.    Provider: Ileana BRADLEY- CNP     Location [facility] [place]   Service Date: 6/10/25            [facility]  New Visit Note      HISTORY OF PRESENT ILLNESS     The patient's referring provider is: No ref. provider found    HISTORY OF PRESENT ILLNESS   Ayah Corral is a 50 y.o. female with a history of hypertension, hyperlipidemia, morbid obesity, who is a former smoker (26 pack years), who presents to a [facility] for a follow up evaluation for bariatric clearance.     I have independently interviewed and examined the patient in the office and reviewed available records.    Current History    On today's visit, the patient reports she reports her respiratory status has been stable.  She denies cough, wheeze, shortness of breath, chest tightness and ER visits for breathing issues.  PFTs showed mild restriction, normal lung volumes and DLCO.  LDCT showing mild upper lobe emphysema and multiple calcified lung nodules and  few noncalcified lung nodules measuring 5 mm in LLL.  Sleep study showing mild sleep apnea, has an appointment with Dr. Fox who ordered CPAP for her.  She is a former smoker of 26 pack years.  Sleep study- mild can order if symptomatic and follow up with sleep medicine    Dr. Tiago Bullock:   The patient is a 49 year old woman who presents today to establish care in our pulmonology office for pre-operative evaluation prior to bariatric surgery. The patient has an extensive past medical history, most significantly, morbid obesity BMI 38 as well as hypertension and hyperlipidemia. The patient also has an extensive tobacco use history, 28 PPD. She does not have signs of symptoms of COPD. We will obtain complete pulmonary function testing as well as polysomnography to evaluation for DONG. The patient will be referred for lung cancer screening assessment.  The patient otherwise doing well without respiratory symptoms. The patient will follow-up closely in 2 month's time.      The patient reports loud snoring with episodic gasping, excessive daytime sleepiness, morning headaches and can fall asleep while watching TV.        Previous pulmonary history: She has no history of recurrent infections, or lung disease as a child.  She had no previous lung hx, never on oxygen or inhaler therapy.     Inhalers/nebulized medications: none    Hospitalization History: She has not been hospitalized over the last year for breathing related problem.    Sleep history: Loud snoring with episodic gasping and excessive daytime sleepiness as well as morning headaches.  Sleep study showed mild sleep apnea is following with sleep medicine for new CPAP.      ALLERGIES AND MEDICATIONS     ALLERGIES  Allergies[1]    MEDICATIONS  Current Medications[2]      PAST HISTORY     PAST MEDICAL HISTORY  Hypertension  Hyperlipidemia  Former smoker    PAST SURGICAL HISTORY  Surgical History[3]    IMMUNIZATION HISTORY  Immunization History   Administered Date(s) Administered    COVID-19, mRNA, LNP-S, PF, 30 mcg/0.3 mL dose 2020, 2021, 2021    Flu vaccine, quadrivalent, no egg protein, age 6 month or greater (FLUCELVAX) 2022, 10/13/2023    Influenza, Unspecified 10/05/2020    Influenza, injectable, quadrivalent 10/12/2021    Influenza, seasonal, injectable 2014, 10/01/2022    Moderna COVID-19 vaccine, 12 years and older (50mcg/0.5mL)(Spikevax) 2024    Pfizer COVID-19 vaccine, 12 years and older, (30mcg/0.3mL) (Comirnaty) 10/13/2023    Pfizer COVID-19 vaccine, bivalent, age 12 years and older (30 mcg/0.3 mL) 2022    Tdap vaccine, age 7 year and older (BOOSTRIX, ADACEL) 2012, 10/18/2022    Zoster vaccine, recombinant, adult (SHINGRIX) 10/13/2023, 2023       SOCIAL HISTORY  Tobacco Smokin -40y/o - 1ppd - 26 pack years  Smokeless Tobacco/Vaping:  no  Illicit drugs: none  Alcohol consumption: socailly  Pets: 1 cat 2 dogs  Living situation: Lives with sister    OCCUPATIONAL/ENVIRONMENTAL HISTORY  Occupation: Registered nurse  No known exposure to asbestos, silica, beryllium or inhaled metals.  No exposure to birds or exotic animals.    FAMILY HISTORY  Family History[4]  No family history of pulmonary disease.      REVIEW OF SYSTEMS     REVIEW OF SYSTEMS  Review of Systems    Constitutional: No fever, no chills, no night sweats.    Eyes: No double vision, no floaters, no dry eyes.   ENT: See HPI.   Neck: No neck stiffness.  Cardiovascular: No sharp chest pain, no heart racing, no leg swelling.  Respiratory: as noted in HPI.   Gastrointestinal: No nausea, no vomiting, no diarrhea.   Musculoskeletal: No joint pain, no back pain.   Integumentary: No rashes or sores.  Neurological: No dizziness, no headaches. Sleeping well.  Psychiatric: No mood changes.   Endocrine: No hot flashes, no cold intolerance, weight is stable.  Hematologic: No easy bruising or bleeding.    PHYSICAL EXAM     VITAL SIGNS:   Vitals:    06/10/25 1250   BP: 115/78   Pulse: 60   Temp: 35.2 °C (95.4 °F)   SpO2: 96%          CURRENT WEIGHT: Body mass index is 38.25 kg/m².    PREVIOUS WEIGHTS:  Wt Readings from Last 3 Encounters:   06/06/25 105 kg (231 lb)   06/04/25 102 kg (225 lb 3.2 oz)   05/07/25 101 kg (223 lb 1.6 oz)       Physical Exam    Constitutional: General appearance: Alert and oriented.  No acute distress. Well developed, well nourished.  Head and face: Symmetric  ENT: external inspection of ear and nose normal. No intranasal polyps. No oropharyngeal exudates.    Oropharynx: normal   Neck: supple, no lymphadenopathy  Pulmonary: Chest is normal. No increased work of breathing or signs of respiratory distress. Clear to auscultation bilaterally - no crackles, wheezing, or rhonchi.   Cardiovascular: Heart rate and rhythm normal. Normal S1, S2 - no murmurs, gallops, or pericardial rub.    Abdomen: Soft, non tender, +BS  Extremities: No edema. No clubbing or cyanosis of the fingernails.    Neurologic: Moves all four extremities   MSK: Normal movements of extremities. Gait normal   Psychiatric: Intact judgement and insight.    RESULTS/DATA     Pulmonary Function Test Results         Pulmonary Function Test - Scan on 5/19/2025  3:23 PM            Pulmonary Function Test - Scan on 5/21/2025  9:50 AM    Chest Radiograph     No results found for this or any previous visit from the past 2000 days.      Chest CT Scan     CT lung screening low dose 04/22/2025    Narrative  Interpreted By:  Rajiv Escalera,  STUDY:  CT LUNG SCREENING LOW DOSE; 4/22/2025 5:46 pm    INDICATION:  Signs/Symptoms:tobacco use, r/o ldct.    COMPARISON:  None.    ACCESSION NUMBER(S):  [id number]    ORDERING CLINICIAN:  JEANNIE CASRTO    TECHNIQUE:  Helical data acquisition of the chest was obtained without IV  contrast material.  Images were reformatted in axial, coronal, and  sagittal planes.    FINDINGS:  LUNGS AND AIRWAYS:  The trachea and central airways are patent. No endobronchial lesion  is seen.    There is mild upper lung predominant centrilobular  emphysema.Otherwise,there is no focal consolidation, pleural  effusion, or pneumothorax.    There are multiple bilateral calcified and noncalcified pulmonary  nodules seen. For example, there is a solid noncalcified 4 mm right  lower lobe nodular density on axial image 200, a 2 mm subpleural  solid nodule posteriorly within left lower lobe on axial image 228,  series 4 and a 5 mm solid nodular density along left anterior lung  base as on axial image 252, series 4.    MEDIASTINUM AND LEO, LOWER NECK AND AXILLA:  The visualized thyroid gland is within normal limits.  No evidence of thoracic lymphadenopathy by CT criteria.  Esophagus appears within normal limits as seen.    HEART AND VESSELS:  The thoracic aorta normal in course and caliber.  Main pulmonary artery and its branches  are normal in caliber.  No coronary artery calcifications seen.  The cardiac chambers are not enlarged.  There is no pericardial effusion seen.    UPPER ABDOMEN:  The visualized subdiaphragmatic structures demonstrate no remarkable  findings.    CHEST WALL AND OSSEOUS STRUCTURES:  Chest wall is within normal limits.  No acute osseous pathology.There are no suspicious osseous  lesions.Mild multilevel degenerative changes within visualized spine.    Impression  1. Multiple small bilateral calcified and noncalcified pulmonary  nodules measuring up to 5 mm, likely benign. Continued screening with  low-dose noncontrast chest CT in 12 months (from current date) is  recommended.  2. Mild upper lung predominant centrilobular emphysema.    LUNG RADS CATEGORY:  Lung Rad: Lung-RADS 2 (Benign Appearance or Indolent Behavior)  Recommendation: Continue annual screening with Low Dose Chest CT in  12 months, recommended as per American College of Radiology  Guidelines Lung-RADS Version 2022.    MACRO:  None    Signed by: Rajiv Escalera 4/23/2025 5:40 AM  Dictation workstation:   [id number]      Echocardiogram     No results found for this or any previous visit from the past 365 days.       Labwork     Lab Results   Component Value Date    WBC 8.8 03/15/2025    HGB 14.0 03/15/2025    HCT 43.0 03/15/2025    MCV 89.2 03/15/2025     03/15/2025      Lab Results   Component Value Date    GLUCOSE 93 03/15/2025    CALCIUM 9.5 03/15/2025     03/15/2025    K 4.1 03/15/2025    CO2 27 03/15/2025     03/15/2025    BUN 16 03/15/2025    CREATININE 0.62 03/15/2025      Lab Results   Component Value Date    ALT 20 03/15/2025    AST 18 03/15/2025    ALKPHOS 83 03/15/2025    BILITOT 0.3 03/15/2025        PT   Date/Time Value Ref Range Status   03/15/2025 09:19 AM 10.4 9.0 - 11.5 sec Final     INR   Date/Time Value Ref Range Status   03/15/2025 09:19 AM 1.0  Final     Comment:     Reference Range                      "0.9-1.1  Moderate-intensity Warfarin Therapy 2.0-3.0  Higher-intensity Warfarin Therapy   3.0-4.0             No results found for: \"ICIGE\", \"IGE\", \"ICA04\", \"ASPFU\", \"IGG\", \"IGA\", \"IGM\"    Peripheral Eosinophil Count/Percentage:   ABSOLUTE EOSINOPHILS (cells/uL)   Date Value   03/15/2025 282     EOSINOPHILS (%)   Date Value   03/15/2025 3.2       ASSESSMENT/PLAN   Ms. Corral is a 50 y.o. female, with a history of hypertension, hyperlipidemia, morbid obesity, who is a former smoker (26 pack years), who presents to a [facility] for a follow up evaluation for bariatric clearance.     Problem List and Orders  Problem List Items Addressed This Visit    None      Assessment and Plan / Recommendations:  Problem List Items Addressed This Visit    None     Bariatric clearance    # Preoperative evaluation:  Patient is at increased risk of postoperative pulmonary complications given age >50 years, American Society of Anesthesiologists [ASA] class >2, mild obstructive sleep apnea. However this increased risk should not preclude the surgery.    - LDCT mild upper lobe centrilobular emphysema    - PFTs with no obstruction, mild restriction and normal DLCO    - To minimize the risk for complications we recommend the following: incentive spirometry to be started before surgery, Use of CPAP machine , early ambulation, minimize sedation, DVT prophylaxis if appropriate per surgical team.   -  can call pulmonary consult while inpatient if needed     Preop risk assessment:  --- ARISCAT score 19 pts = LOW risk - 1.6 % risk of in- hospital post-op pulmonary complications.   --- Per arozullah respiratory failure index - Estimated risk probability for postoperative respiratory failure 0.22 %.     ---->>>>  Surgical length    - bariatric surgery sleeve 1-2, bypass 2-3       ASA II   - A patient with mild systemic disease   - Mild diseases only without substantive functional limitations.   - Current smoker, " social alcohol drinker, pregnancy, obesity (30<BMI<40), well-controlled DM/HTN, mild lung disease   - Asymptomatic congenital cardiac disease, well controlled dysrhythmias, asthma without exacerbation, well controlled epilepsy, non-insulin dependent diabetes mellitus, abnormal BMI percentile for age, mild/moderate DONG, oncologic state in remission, autism with mild limitations    ---------------------------------------------------------------------------------    Emphysema; mild upper lobe predominant centrilobular  - Asymptomatic  - Follow-up if any symptoms issues arise    DONG  - Continue to follow with sleep medicine    Follow up as needed.    If you have any questions please call the office [phone]    Thank you for visiting the Pulmonary clinic today!   Ileanajose a Pierson CNP  [phone]         [1]   Allergies  Allergen Reactions    Sulfamethoxazole-Trimethoprim Rash and Swelling   [2]   Current Outpatient Medications   Medication Sig Dispense Refill    ascorbic acid (Vitamin C) 250 mg tablet Take daily with iron 90 tablet 0    cephalexin (Keflex) 500 mg capsule Take 1 capsule (500 mg) by mouth 3 times a day for 7 days. 21 capsule 0    cholecalciferol (Vitamin D3) 5,000 Units tablet Take 1 tab by mouth daily.      escitalopram (Lexapro) 10 mg tablet Take 1.5 tablets (15 mg) by mouth once daily.      ferrous sulfate 325 (65 Fe) mg EC tablet Take 1 tablet by mouth once daily. Do not crush, chew, or split. 90 tablet 0    fexofenadine HCl (ALLEGRA ORAL) Take 1 tablet by mouth once daily.      hydroCHLOROthiazide (HYDRODiuril) 25 mg tablet Take 1 tablet (25 mg) by mouth once daily. 90 tablet 3    levothyroxine (Synthroid, Levoxyl) 125 mcg tablet Take 1.5 tabs by mouth 2 days/week and 1 tab by mouth 5 days/week. (Patient taking differently: Take 1.5 tabs by mouth 1 days/week and 1 tab by mouth 6 days/week.) 96 tablet 3    omega 3-dha-epa-fish oil 1,000 mg (120 mg-180 mg) capsule Take 1 capsule (1,000 mg) by mouth  once daily.      omeprazole OTC (PriLOSEC OTC) 20 mg EC tablet Take 1 tablet (20 mg) by mouth once daily in the morning. Take before meals. Do not crush, chew, or split.       No current facility-administered medications for this visit.   [3]   Past Surgical History:  Procedure Laterality Date    WISDOM TOOTH EXTRACTION  10/2011   [4]   Family History  Problem Relation Name Age of Onset    Hearing loss Mother Nelda     Hypertension Mother Nelda     Heart murmur Mother Nelda     Allergies Mother Nleda     Alcohol abuse Father Duane     Hypertension Father Duane     Alcohol abuse Maternal Grandfather Ed     Cancer Maternal Grandmother Leslie     Cancer Paternal Grandmother [place]     Breast cancer Paternal Grandmother Alyssa     Diabetes Sister Amina     Depression Sister Amina     Diabetes type II Sister Amina     Obesity Sister Amina     Thyroid disease Sister Amina     Allergies Sister Amina

## 2025-06-06 NOTE — PROGRESS NOTES
"Subjective   Ayah Corral is a 50 y.o. female who presents for 6 month Follow-up.    HPI   Mood stable w/current Lexapro dose.    C/o R parotid swelling x2 days.  Mild improvement.  Tx: Ice, Ibuprofen.    Moving forward w/Bariatric surgery.    Review of Systems   Constitutional:  Negative for fatigue and fever.   Respiratory:  Negative for cough and shortness of breath.    Cardiovascular:  Negative for chest pain and leg swelling.   All other systems reviewed and are negative.      Health Maintenance Due   Topic Date Due    HIV Screening  Never done    MMR Vaccines (1 of 1 - Standard series) Never done    Hepatitis C Screening  Never done    Pneumococcal Vaccine (1 of 2 - PCV) Never done    Hepatitis B Vaccines (2 of 2 - CpG 2-dose series) 10/11/2024    Yearly Adult Physical  06/27/2025       Objective   /80   Pulse 72   Temp 36.6 °C (97.8 °F)   Resp 16   Ht 1.664 m (5' 5.5\")   Wt 105 kg (231 lb)   SpO2 100%   BMI 37.86 kg/m²     Physical Exam  Vitals and nursing note reviewed.   Constitutional:       Appearance: Normal appearance.   HENT:      Head: Normocephalic.   Eyes:      Conjunctiva/sclera: Conjunctivae normal.      Pupils: Pupils are equal, round, and reactive to light.   Cardiovascular:      Rate and Rhythm: Normal rate and regular rhythm.      Pulses: Normal pulses.      Heart sounds: Normal heart sounds.   Pulmonary:      Effort: Pulmonary effort is normal.      Breath sounds: Normal breath sounds.   Musculoskeletal:         General: No swelling.      Cervical back: Neck supple.   Skin:     General: Skin is warm and dry.   Neurological:      General: No focal deficit present.      Mental Status: She is oriented to person, place, and time.         Assessment/Plan   Problem List Items Addressed This Visit       Primary hypertension    Mixed hyperlipidemia    Depression    Relevant Medications    escitalopram (Lexapro) 10 mg tablet    Recurrent major depressive disorder, in partial remission "    Relevant Medications    escitalopram (Lexapro) 10 mg tablet    Prediabetes    Morbid (severe) obesity due to excess calories (Multi)    Obesity (BMI 35.0-39.9 without comorbidity)    DONG (obstructive sleep apnea) - Primary     Other Visit Diagnoses         Parotid sialadenitis        Relevant Medications    cephalexin (Keflex) 500 mg capsule          Sour candy  Keflex call if not resolved in 7 days  Cont meds  Reviewed labs  Signed form in support of gastric bypass  Stable based on symptoms and exam.  Continue established treatment plan and follow up at least yearly.

## 2025-06-09 ENCOUNTER — APPOINTMENT (OUTPATIENT)
Dept: BEHAVIORAL HEALTH | Facility: CLINIC | Age: 50
End: 2025-06-09
Payer: COMMERCIAL

## 2025-06-10 ENCOUNTER — APPOINTMENT (OUTPATIENT)
Facility: CLINIC | Age: 50
End: 2025-06-10
Payer: COMMERCIAL

## 2025-06-10 VITALS
HEART RATE: 60 BPM | OXYGEN SATURATION: 96 % | WEIGHT: 233.4 LBS | DIASTOLIC BLOOD PRESSURE: 78 MMHG | BODY MASS INDEX: 38.25 KG/M2 | TEMPERATURE: 95.4 F | SYSTOLIC BLOOD PRESSURE: 115 MMHG

## 2025-06-10 DIAGNOSIS — Z01.811 PREOP PULMONARY/RESPIRATORY EXAM: ICD-10-CM

## 2025-06-10 DIAGNOSIS — Z98.84 BARIATRIC SURGERY STATUS: Primary | ICD-10-CM

## 2025-06-10 PROCEDURE — 3074F SYST BP LT 130 MM HG: CPT

## 2025-06-10 PROCEDURE — 3078F DIAST BP <80 MM HG: CPT

## 2025-06-10 PROCEDURE — 99214 OFFICE O/P EST MOD 30 MIN: CPT

## 2025-06-10 ASSESSMENT — PAIN SCALES - GENERAL: PAINLEVEL_OUTOF10: 0-NO PAIN

## 2025-06-10 ASSESSMENT — ENCOUNTER SYMPTOMS
LOSS OF SENSATION IN FEET: 0
OCCASIONAL FEELINGS OF UNSTEADINESS: 0
DEPRESSION: 0

## 2025-06-11 ENCOUNTER — APPOINTMENT (OUTPATIENT)
Dept: BEHAVIORAL HEALTH | Facility: CLINIC | Age: 50
End: 2025-06-11
Payer: COMMERCIAL

## 2025-06-11 DIAGNOSIS — E66.9 OBESITY (BMI 35.0-39.9 WITHOUT COMORBIDITY): Primary | ICD-10-CM

## 2025-06-11 DIAGNOSIS — F54 PSYCHOLOGICAL FACTORS AFFECTING MEDICAL CONDITION: ICD-10-CM

## 2025-06-11 PROCEDURE — 90791 PSYCH DIAGNOSTIC EVALUATION: CPT | Performed by: PSYCHOLOGIST

## 2025-06-13 ENCOUNTER — APPOINTMENT (OUTPATIENT)
Dept: BEHAVIORAL HEALTH | Facility: CLINIC | Age: 50
End: 2025-06-13
Payer: COMMERCIAL
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
## 2025-07-07 ENCOUNTER — NUTRITION (OUTPATIENT)
Dept: SURGERY | Facility: CLINIC | Age: 50
End: 2025-07-07
Payer: COMMERCIAL

## 2025-07-07 VITALS — WEIGHT: 222.9 LBS | BODY MASS INDEX: 35.82 KG/M2 | HEIGHT: 66 IN

## 2025-07-07 NOTE — PROGRESS NOTES
S:    Pt states she eats 3 meals per day that contain protein.  She is still drinking coffee every day.  Pt states she is doing better following the 30-30-30 rule.  Pt takes about 30 min to complete a meal.  She needs to practice chewing her food better.       24 hr   recall:   B:  egg sandwich on keto bread  S:   protein shake   L:   leftover meat:  steak, salad   S:  none  D:   veggies and humus     S:   1/2 c cottage cheese  12 g   Beverages:  1 c coffee per day, 64 oz water/day    O:    Wt:  222.9    Ht:    65.5          BMI: 36.3    Goal: 5% body weight loss over the course of program    Dietary recommendation:   1. Eliminate caffeinated beverages  2. Continue to practice the 30-30-30 rule by drinking between meals.  3. Continue  have 3 meals and 1 snack daily.  4. Aim for 3-4 oz protein   5.Practice chewing your food well.   6 Increase physical activity by 10-15 minutes to an end goal of 60 minutes 5 x per week.    A/P:  pt is doing well.  She has lost almost 10 pounds since her initial assessment.  From a nutritional standpoint, she is cleared for surgery.  Recommend that she continue to work on weight loss and practice the postop behaviors. She will continue to follow up monthly until close to having surgery.     Exercise:  walking for  30-40 min  3-4x/week,  light weights 2x/week, under the desk peddler for 40 min 5x/week     Yancy Amaya RD, ITZ

## 2025-07-31 ENCOUNTER — APPOINTMENT (OUTPATIENT)
Dept: SLEEP MEDICINE | Facility: CLINIC | Age: 50
End: 2025-07-31
Payer: COMMERCIAL

## 2025-07-31 DIAGNOSIS — Z98.84 BARIATRIC SURGERY STATUS: ICD-10-CM

## 2025-07-31 DIAGNOSIS — Z01.818 PREOPERATIVE CLEARANCE: ICD-10-CM

## 2025-07-31 DIAGNOSIS — G47.33 OSA (OBSTRUCTIVE SLEEP APNEA): Primary | ICD-10-CM

## 2025-07-31 DIAGNOSIS — E66.9 OBESITY (BMI 35.0-39.9 WITHOUT COMORBIDITY): ICD-10-CM

## 2025-07-31 PROCEDURE — 99213 OFFICE O/P EST LOW 20 MIN: CPT | Performed by: PSYCHIATRY & NEUROLOGY

## 2025-07-31 NOTE — PROGRESS NOTES
Patient: Ayah Corral    [id number]  : 1975 -- AGE 50 y.o.    Provider: James Roque MD     Levine, Susan. \Hospital Has a New Name and Outlook.\""   Service Date: 2025              [facility] [place] [facility]  Follow-up Note      Virtual or Telephone Consent  An interactive audio and video telecommunication system which permits real time communications between the patient (at the originating site) and provider (at the distant site) was utilized to provide this telehealth service.   Verbal consent was requested and obtained from Ayah Corral on this date, 25 for a telehealth visit and the patient's location was confirmed at the time of the visit.  I verified the patient's identity and physical location in [place].  If this is a new patient to me, I informed the patient of my name and type of active [place] license that I hold.          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     Using CPAP for the last ~5 weeks. Some nights she takes it off during the night without realizing it.    Feels benefit from using it. No longer drowsy at her work desk. No longer having drowsy driving. Sleep is restorative. No snoring with CPAP. No more nocturnal excessive sweating. No more nocturnal reflux. Only had a morning headache after using CPAP for only 1 hour. Increased her humidity by 1 level and now no more dry mouth. No more RLS symptoms.    Planning for bariatric surgery in early Sept.    PAP DEVICE   DME: [facility]  Setup date: 25  Type: CPAP  Finding benefit: yes    MASK  Type: Nasal pillow  Fit: good  Patient is keeping the equipment clean and supplies are being renewed at appropriate intervals.     Prior Sleep studies:   HST 5/15/25:  weight 101.2 kg, BMI 36.1. Mild DONG with respiratory events generally occurring in a few cyclic clusters. AHI3% 12/h due to 75 hypopneas, mean SpO2 92%, noah 84% with 21.5 min <=90% and 0.9 min <=88%.         REVIEW OF MACHINE DOWNLOAD:     Problem List[1]  Medical History[2]  Surgical History[3]  Medications Ordered Prior to Encounter[4]    PHYSICAL EXAMINATION:   There were no vitals filed for this visit.  There is no height or weight on file to calculate BMI.  General: Awake. Alert. Comfortable. No apparent distress.   Speech: Normal.  Comprehension: Normal.  Mood: Stable.  Affect: Appropriate.  Pul:         Normal respiratory effort.   Abd:         Obese  Neuro: Alert, well-oriented. Cranial nerves II-XII grossly normal and symmetric. No abnormal movements noted.       ASSESSMENT AND PLAN: Ms. Ayah Corral is a 50 y.o. female with mild DONG doing great on CPAP, who is going through the bariatric surgery process. She is cleared from my perspective.      #DONG - Patient's sleep apnea is under very good control with CPAP, she is deriving significant subjective benefit from treatment, her compliance is excellent, and mask leak is well controlled overall. Only issue is that occasionally she takes off her mask during sleep without her realizing it until she wakes up in the morning  -continue CPAP at current settings, continue nightly usage  -may was advised to try sleeping with a chin strap over her mask's head gear to try to keep CPAP on longer at night    #obesity  #preoperative clearance  #bariatric surgery status  -continue current PAP settings  - We discussed the importance of PAP therapy in the perioperative period, as well as expectations for possible changes in therapy with weight loss.   - Due to DONG and morbid obesity, patient may be at higher but not prohibitive risk of post-operative respiratory complications.   - Patient is currently optimized on PAP therapy for sleep apnea as long as patient is  compliant with PAP use per most recent download.  - May go ahead with contemplated surgery at this time of examination with the following recommendations:           1. Notify Anesthesia of patient's Sleep Apnea Diagnosis & Therapy.           2. If intubation is required, additional efforts, such as fiberoptic guidance may be required.           3. Use PAP preOp and postOp as soon as able. Patient was told to bring PAP machine and all other equipment to surgery.          4. Prefer recovery in a monitored setting like sleep apnea bed, SICU, or surgical step-down due to increased risk of complications related to sleep apnea.          5. Minimize narcotics and avoid benzodiazepines as much as possible since these medications can worsen sleep apnea.          6. Elevate the Head of bed after surgery at about 30 degrees or higher whichever is comfortable to patient.           7. Aggressive pulmonary toilet (Incentive spirometry, early ambulation) preOp and postOp           8. If patient developed perioperative bronchospasm, give Albuterol w/ or w/o Atrovent via nebulization as needed.          9. Pulmonary Service consult post-operatively, if indicated.          10. DVT prophylaxis  - This patient will require CPAP post-op and upon discharge for a period of time.   - Recommend follow-up with me in 6 months after surgery.  - When the patient has reached a new low plateau weight, they should be reassessed as to whether significant Sleep apnea persists and whether CPAP is still required. Therefore, at that time, a repeat Split-Night PSG should be performed after 2 weeks off CPAP. Then the CPAP can either be discontinued or set to the new lower pressure level. Also, CPAP alternatives could be discussed.      All of the above was discussed with the patient in detail. She voiced an understanding of the above and was agreeable to proceed further as advised.     24 minutes were spent on this encounter, including time spent with  the patient, time spent reviewing the chart, updating the chart as needed, and documenting.     FOLLOW UP: Feb 26 at 1030 AM via video for 6 month post bariatric surgery follow up, sooner if needed    CC: Dr. Lane       [1]   Patient Active Problem List  Diagnosis    Primary hypertension    Mixed hyperlipidemia    Acquired hypothyroidism    Allergic rhinitis due to pollen    Depression    Vitamin D deficiency    Recurrent major depressive disorder, in partial remission    Prediabetes    Morbid (severe) obesity due to excess calories (Multi)    Obesity (BMI 35.0-39.9 without comorbidity)    Preoperative cardiovascular examination    Former smoker    DONG (obstructive sleep apnea)   [2]   Past Medical History:  Diagnosis Date    Allergic 2013    Allergic rhinitis     Depression 12/2002    Disease of thyroid gland 2008?    Eczema 5/2020    GERD (gastroesophageal reflux disease)     Hypertension     Hypothyroid     Motion sickness 1976   [3]   Past Surgical History:  Procedure Laterality Date    ESOPHAGOGASTRODUODENOSCOPY  7/23/25    WISDOM TOOTH EXTRACTION  10/2011   [4]   Current Outpatient Medications on File Prior to Visit   Medication Sig Dispense Refill    ascorbic acid (Vitamin C) 250 mg tablet Take daily with iron 90 tablet 0    cholecalciferol (Vitamin D3) 5,000 Units tablet Take 1 tab by mouth daily.      escitalopram (Lexapro) 10 mg tablet Take 1.5 tablets (15 mg) by mouth once daily.      fexofenadine HCl (ALLEGRA ORAL) Take 1 tablet by mouth once daily.      hydroCHLOROthiazide (HYDRODiuril) 25 mg tablet Take 1 tablet (25 mg) by mouth once daily. 90 tablet 3    levothyroxine (Synthroid, Levoxyl) 125 mcg tablet Take 1.5 tabs by mouth 2 days/week and 1 tab by mouth 5 days/week. 96 tablet 3    omeprazole OTC (PriLOSEC OTC) 20 mg EC tablet Take 1 tablet (20 mg) by mouth once daily in the morning. Take before meals. Do not crush, chew, or split.      omega 3-dha-epa-fish oil 1,000 mg (120 mg-180 mg) capsule Take  1 capsule (1,000 mg) by mouth once daily. (Patient not taking: Reported on 7/31/2025)       No current facility-administered medications on file prior to visit.

## 2025-08-09 ENCOUNTER — TELEMEDICINE CLINICAL SUPPORT (OUTPATIENT)
Dept: SURGERY | Facility: CLINIC | Age: 50
End: 2025-08-09
Payer: COMMERCIAL

## 2025-08-09 ENCOUNTER — NUTRITION (OUTPATIENT)
Dept: SURGERY | Facility: CLINIC | Age: 50
End: 2025-08-09
Payer: COMMERCIAL

## 2025-08-09 VITALS — BODY MASS INDEX: 35.53 KG/M2 | HEIGHT: 66 IN | WEIGHT: 221.1 LBS

## 2025-08-09 NOTE — PROGRESS NOTES
S:  pt is still following her meal plan, eating 3 meals per day that contain protein.  She has been practicing the postop behaviors.  She is meeting her fluid goal.    Pt has purchased all supplements for after surgery.      O:    Wt:  221.1    Ht:   65.5 in            BMI: 36.2    Goal: 5% body weight loss over the course of program    Dietary recommendation:   1. Continue to practice the 30-30-30 rule by drinking between meals.  2. Continue to have 3 meals and 1 snack daily.  3.  Increase physical activity by 10-15 minutes to an end goal of 60 minutes 5 x per week.    A/P:   Pt is doing well.  Recommend that she continue to follow he meal plan, practice the post op behaviors and exercise.  No follow up     Exercise:  walking for  30-40 min  3-4x/week,  light weights 2x/week, under the desk peddler for 40 min 5x/week     Yancy Amaya RD, LD

## 2025-08-12 ENCOUNTER — DOCUMENTATION (OUTPATIENT)
Dept: SURGERY | Facility: CLINIC | Age: 50
End: 2025-08-12
Payer: COMMERCIAL

## 2025-08-14 ENCOUNTER — TELEPHONE (OUTPATIENT)
Dept: SURGERY | Facility: CLINIC | Age: 50
End: 2025-08-14
Payer: COMMERCIAL

## 2025-08-20 ENCOUNTER — DOCUMENTATION (OUTPATIENT)
Dept: SURGERY | Facility: CLINIC | Age: 50
End: 2025-08-20
Payer: COMMERCIAL

## 2025-08-25 ENCOUNTER — TELEPHONE (OUTPATIENT)
Dept: SURGERY | Facility: CLINIC | Age: 50
End: 2025-08-25
Payer: COMMERCIAL

## 2025-08-25 DIAGNOSIS — Z01.818 PREOPERATIVE CLEARANCE: ICD-10-CM

## 2025-08-25 DIAGNOSIS — Z98.84 BARIATRIC SURGERY STATUS: Primary | ICD-10-CM

## 2025-08-26 ENCOUNTER — TELEPHONE (OUTPATIENT)
Dept: SURGERY | Facility: CLINIC | Age: 50
End: 2025-08-26
Payer: COMMERCIAL

## 2025-08-28 PROBLEM — Z98.84 BARIATRIC SURGERY STATUS: Status: ACTIVE | Noted: 2025-08-25

## 2025-10-09 ENCOUNTER — APPOINTMENT (OUTPATIENT)
Dept: DERMATOLOGY | Facility: CLINIC | Age: 50
End: 2025-10-09
Payer: COMMERCIAL

## 2025-12-05 ENCOUNTER — APPOINTMENT (OUTPATIENT)
Dept: PRIMARY CARE | Facility: CLINIC | Age: 50
End: 2025-12-05
Payer: COMMERCIAL

## 2026-02-26 ENCOUNTER — APPOINTMENT (OUTPATIENT)
Dept: SLEEP MEDICINE | Facility: CLINIC | Age: 51
End: 2026-02-26
Payer: COMMERCIAL